# Patient Record
Sex: MALE | Race: BLACK OR AFRICAN AMERICAN | NOT HISPANIC OR LATINO | URBAN - METROPOLITAN AREA
[De-identification: names, ages, dates, MRNs, and addresses within clinical notes are randomized per-mention and may not be internally consistent; named-entity substitution may affect disease eponyms.]

---

## 2022-06-22 VITALS
RESPIRATION RATE: 16 BRPM | TEMPERATURE: 98 F | SYSTOLIC BLOOD PRESSURE: 144 MMHG | DIASTOLIC BLOOD PRESSURE: 65 MMHG | WEIGHT: 240.08 LBS | HEIGHT: 73 IN | OXYGEN SATURATION: 99 % | HEART RATE: 68 BPM

## 2022-06-22 LAB
ALBUMIN SERPL ELPH-MCNC: 4.1 G/DL — SIGNIFICANT CHANGE UP (ref 3.3–5)
ALP SERPL-CCNC: 73 U/L — SIGNIFICANT CHANGE UP (ref 40–120)
ALT FLD-CCNC: 10 U/L — SIGNIFICANT CHANGE UP (ref 10–45)
ANION GAP SERPL CALC-SCNC: 11 MMOL/L — SIGNIFICANT CHANGE UP (ref 5–17)
APTT BLD: 33.1 SEC — SIGNIFICANT CHANGE UP (ref 27.5–35.5)
AST SERPL-CCNC: 14 U/L — SIGNIFICANT CHANGE UP (ref 10–40)
BASOPHILS # BLD AUTO: 0.03 K/UL — SIGNIFICANT CHANGE UP (ref 0–0.2)
BASOPHILS NFR BLD AUTO: 0.5 % — SIGNIFICANT CHANGE UP (ref 0–2)
BILIRUB SERPL-MCNC: 1.1 MG/DL — SIGNIFICANT CHANGE UP (ref 0.2–1.2)
BUN SERPL-MCNC: 6 MG/DL — LOW (ref 7–23)
CALCIUM SERPL-MCNC: 8.7 MG/DL — SIGNIFICANT CHANGE UP (ref 8.4–10.5)
CHLORIDE SERPL-SCNC: 109 MMOL/L — HIGH (ref 96–108)
CO2 SERPL-SCNC: 22 MMOL/L — SIGNIFICANT CHANGE UP (ref 22–31)
CREAT SERPL-MCNC: 0.91 MG/DL — SIGNIFICANT CHANGE UP (ref 0.5–1.3)
EGFR: 122 ML/MIN/1.73M2 — SIGNIFICANT CHANGE UP
EOSINOPHIL # BLD AUTO: 0.17 K/UL — SIGNIFICANT CHANGE UP (ref 0–0.5)
EOSINOPHIL NFR BLD AUTO: 2.6 % — SIGNIFICANT CHANGE UP (ref 0–6)
GLUCOSE SERPL-MCNC: 106 MG/DL — HIGH (ref 70–99)
HCT VFR BLD CALC: 32.7 % — LOW (ref 39–50)
HGB BLD-MCNC: 11.2 G/DL — LOW (ref 13–17)
IMM GRANULOCYTES NFR BLD AUTO: 0.2 % — SIGNIFICANT CHANGE UP (ref 0–1.5)
INR BLD: 1.27 — HIGH (ref 0.88–1.16)
LIDOCAIN IGE QN: 23 U/L — SIGNIFICANT CHANGE UP (ref 7–60)
LYMPHOCYTES # BLD AUTO: 1.88 K/UL — SIGNIFICANT CHANGE UP (ref 1–3.3)
LYMPHOCYTES # BLD AUTO: 28.7 % — SIGNIFICANT CHANGE UP (ref 13–44)
MAGNESIUM SERPL-MCNC: 1.8 MG/DL — SIGNIFICANT CHANGE UP (ref 1.6–2.6)
MCHC RBC-ENTMCNC: 25.9 PG — LOW (ref 27–34)
MCHC RBC-ENTMCNC: 34.3 GM/DL — SIGNIFICANT CHANGE UP (ref 32–36)
MCV RBC AUTO: 75.5 FL — LOW (ref 80–100)
MONOCYTES # BLD AUTO: 0.39 K/UL — SIGNIFICANT CHANGE UP (ref 0–0.9)
MONOCYTES NFR BLD AUTO: 6 % — SIGNIFICANT CHANGE UP (ref 2–14)
NEUTROPHILS # BLD AUTO: 4.06 K/UL — SIGNIFICANT CHANGE UP (ref 1.8–7.4)
NEUTROPHILS NFR BLD AUTO: 62 % — SIGNIFICANT CHANGE UP (ref 43–77)
NRBC # BLD: 0 /100 WBCS — SIGNIFICANT CHANGE UP (ref 0–0)
PLATELET # BLD AUTO: 213 K/UL — SIGNIFICANT CHANGE UP (ref 150–400)
POTASSIUM SERPL-MCNC: 3.5 MMOL/L — SIGNIFICANT CHANGE UP (ref 3.5–5.3)
POTASSIUM SERPL-SCNC: 3.5 MMOL/L — SIGNIFICANT CHANGE UP (ref 3.5–5.3)
PROT SERPL-MCNC: 7.1 G/DL — SIGNIFICANT CHANGE UP (ref 6–8.3)
PROTHROM AB SERPL-ACNC: 15.2 SEC — HIGH (ref 10.5–13.4)
RBC # BLD: 4.33 M/UL — SIGNIFICANT CHANGE UP (ref 4.2–5.8)
RBC # FLD: 16.1 % — HIGH (ref 10.3–14.5)
SARS-COV-2 RNA SPEC QL NAA+PROBE: NEGATIVE — SIGNIFICANT CHANGE UP
SODIUM SERPL-SCNC: 142 MMOL/L — SIGNIFICANT CHANGE UP (ref 135–145)
WBC # BLD: 6.54 K/UL — SIGNIFICANT CHANGE UP (ref 3.8–10.5)
WBC # FLD AUTO: 6.54 K/UL — SIGNIFICANT CHANGE UP (ref 3.8–10.5)

## 2022-06-22 PROCEDURE — G1004: CPT

## 2022-06-22 PROCEDURE — 74177 CT ABD & PELVIS W/CONTRAST: CPT | Mod: 26,MG

## 2022-06-22 PROCEDURE — 99285 EMERGENCY DEPT VISIT HI MDM: CPT

## 2022-06-22 RX ORDER — DIATRIZOATE MEGLUMINE 180 MG/ML
30 INJECTION, SOLUTION INTRAVESICAL ONCE
Refills: 0 | Status: COMPLETED | OUTPATIENT
Start: 2022-06-22 | End: 2022-06-22

## 2022-06-22 RX ORDER — SODIUM CHLORIDE 9 MG/ML
1000 INJECTION INTRAMUSCULAR; INTRAVENOUS; SUBCUTANEOUS ONCE
Refills: 0 | Status: COMPLETED | OUTPATIENT
Start: 2022-06-22 | End: 2022-06-22

## 2022-06-22 RX ORDER — HYDROMORPHONE HYDROCHLORIDE 2 MG/ML
1 INJECTION INTRAMUSCULAR; INTRAVENOUS; SUBCUTANEOUS ONCE
Refills: 0 | Status: DISCONTINUED | OUTPATIENT
Start: 2022-06-22 | End: 2022-06-22

## 2022-06-22 RX ADMIN — DIATRIZOATE MEGLUMINE 30 MILLILITER(S): 180 INJECTION, SOLUTION INTRAVESICAL at 21:20

## 2022-06-22 RX ADMIN — HYDROMORPHONE HYDROCHLORIDE 1 MILLIGRAM(S): 2 INJECTION INTRAMUSCULAR; INTRAVENOUS; SUBCUTANEOUS at 23:23

## 2022-06-22 RX ADMIN — SODIUM CHLORIDE 1000 MILLILITER(S): 9 INJECTION INTRAMUSCULAR; INTRAVENOUS; SUBCUTANEOUS at 22:27

## 2022-06-22 RX ADMIN — HYDROMORPHONE HYDROCHLORIDE 1 MILLIGRAM(S): 2 INJECTION INTRAMUSCULAR; INTRAVENOUS; SUBCUTANEOUS at 22:27

## 2022-06-22 RX ADMIN — SODIUM CHLORIDE 1000 MILLILITER(S): 9 INJECTION INTRAMUSCULAR; INTRAVENOUS; SUBCUTANEOUS at 21:20

## 2022-06-22 NOTE — ED PROVIDER NOTE - PROGRESS NOTE DETAILS
surgery consulted. will see pt. surgery reccs for admission to surgery/regional under dr salgado. surgery agrees no medication at this time and admission to surgery/regional under dr salgado.

## 2022-06-22 NOTE — ED PROVIDER NOTE - OBJECTIVE STATEMENT
21M nonsmoker, sickle cell anemia (last crisis 3/2022), c/o 3d diffuse crampy abd pain and loose brown stool x2-3 episodes per day and now 2d postprandial nbnb emesis. no change in appetite -- feels hungry. seen at [facility] 2d ago and offered admission but declined at that time given "family emergency." subsequently notified yesterday +e coli on stool cx. no ct imaging done at that time. pt also reports ble pain consistent w/ his usual sickle cell crisis. no fever/chills, no ha/dizziness, no uri/cough, no cp/sob, no hematochezia/melena, no dysuria, no rash, no recent travel, no sick contacts, no prior covid, no trauma, no etoh-dpt/ivdu, no abd surgeries, no phx/fhx GI disease. 21M nonsmoker, sickle cell anemia (last crisis 3/2022 -- usually joint/extremity pain, never abd), c/o 3d diffuse crampy abd pain and loose brown stool x2-3 episodes per day and now 2d postprandial nbnb emesis. no change in appetite -- feels hungry. seen at [facility] 2d ago and offered admission but declined at that time given "family emergency." subsequently notified yesterday +e coli on stool cx. no ct imaging done at that time. pt also reports ble pain consistent w/ his usual sickle cell crisis. no fever/chills, no ha/dizziness, no uri/cough, no cp/sob, no hematochezia/melena, no dysuria, no rash, no recent travel, no sick contacts, no prior covid, no trauma, no etoh-dpt/ivdu, no abd surgeries, no phx/fhx GI disease.

## 2022-06-22 NOTE — ED ADULT NURSE NOTE - OBJECTIVE STATEMENT
pt c/o abdominal pain for the past 5 days. pt went to [facility] 2 days ago, got worked up, and was told to come to the ED for antibiotics because stool was + for e.coli. abdomen is soft nondistended. endorses nausea, vomiting and diarrhea. vomit is orange in color, and has brown watery liquid stool. pt has hx of SCA and asthma.

## 2022-06-22 NOTE — ED PROVIDER NOTE - PHYSICAL EXAMINATION
CONST: nontoxic NAD speaking in full sentences  HEAD: atraumatic  EYES: conjunctivae clear, PERRL, EOMI  ENT: mmm  NECK: supple/FROM  CARD: rrr no murmurs  CHEST: ctab no r/r/w,  ABD: soft, nd, nttp, no rebound/guarding  EXT: FROM, symmetric distal pulses intact  SKIN: warm, dry, no rash, no pedal edema/ttp/rash, cap refill <2sec  NEURO: a+ox3, 5/5 strength x4, gross sensation intact x4, baseline gait CONST: nontoxic NAD speaking in full sentences  HEAD: atraumatic  EYES: conjunctivae clear, PERRL, EOMI  ENT: mmm  NECK: supple/FROM  CARD: rrr no murmurs  CHEST: ctab no r/r/w,  ABD: soft, nd, +mild nonfocal diffuse ttp, no rebound/guarding  EXT: FROM, symmetric distal pulses intact  SKIN: warm, dry, no rash, no pedal edema/ttp/rash, cap refill <2sec  NEURO: a+ox3, 5/5 strength x4, gross sensation intact x4, baseline gait

## 2022-06-22 NOTE — ED PROVIDER NOTE - CLINICAL SUMMARY MEDICAL DECISION MAKING FREE TEXT BOX
avss. nontoxic. NAD. found to have colitis vs early appendicitis on ct a/p in setting of sickle cell. no e/o sepsis. lactate wnl. LFTs/lipase neg. stool cx pending. pain controlled s/p diluadid/ivf. still w/ persistent diarrhea while in ED. surgery consulted. reportedly recent stool cx e coli+. no indication for abx at this time pending ecoli differentiation on stool cx. will admit per reccs.

## 2022-06-22 NOTE — ED ADULT NURSE NOTE - NSIMPLEMENTINTERV_GEN_ALL_ED
Implemented All Universal Safety Interventions:  [place]innie to call system. Call bell, personal items and telephone within reach. Instruct patient to call for assistance. Room bathroom lighting operational. Non-slip footwear when patient is off stretcher. Physically safe environment: no spills, clutter or unnecessary equipment. Stretcher in lowest position, wheels locked, appropriate side rails in place.

## 2022-06-22 NOTE — ED PROVIDER NOTE - SHIFT CHANGE DETAILS
21M sickle cell c/o abd pain/n/v/d and recurrent sickle cell pain in ble. avss. reportedly told he had ecoli on stool cx. no indication for abx at this time. found to have colitis vs appendicitis on ct a/p. surgery consulted.    dispo: pending final ct a/p read and surgery reccs -- anticipate admission

## 2022-06-22 NOTE — ED PROVIDER NOTE - NS ED MD DISPO ADMIT [facility] PALLIATIVE CARE
Detail Level: Detailed Consent: The risks of atrophy were reviewed with the patient. Concentration Of Solution Injected (Mg/Ml): 10.0 X Size Of Lesion In Cm (Optional): 0 Include Z78.9 (Other Specified Conditions Influencing Health Status) As An Associated Diagnosis?: No Total Volume Injected (Ccs- Only Use Numbers And Decimals): .1 Medical Necessity Clause: This procedure was medically necessary because the lesions that were treated were: Kenalog Preparation: Kenalog Validate Note Data When Using Inventory: Yes NONE

## 2022-06-23 ENCOUNTER — INPATIENT (INPATIENT)
Facility: HOSPITAL | Age: 22
LOS: 2 days | Discharge: HOME | DRG: 371 | End: 2022-06-26
Attending: STUDENT IN AN ORGANIZED HEALTH CARE EDUCATION/TRAINING PROGRAM | Admitting: SURGERY
Payer: COMMERCIAL

## 2022-06-23 LAB
ANION GAP SERPL CALC-SCNC: 11 MMOL/L — SIGNIFICANT CHANGE UP (ref 5–17)
BLD GP AB SCN SERPL QL: NEGATIVE — SIGNIFICANT CHANGE UP
BUN SERPL-MCNC: 6 MG/DL — LOW (ref 7–23)
CALCIUM SERPL-MCNC: 8.7 MG/DL — SIGNIFICANT CHANGE UP (ref 8.4–10.5)
CHLORIDE SERPL-SCNC: 108 MMOL/L — SIGNIFICANT CHANGE UP (ref 96–108)
CO2 SERPL-SCNC: 24 MMOL/L — SIGNIFICANT CHANGE UP (ref 22–31)
CREAT SERPL-MCNC: 0.93 MG/DL — SIGNIFICANT CHANGE UP (ref 0.5–1.3)
CULTURE RESULTS: SIGNIFICANT CHANGE UP
EGFR: 119 ML/MIN/1.73M2 — SIGNIFICANT CHANGE UP
GLUCOSE SERPL-MCNC: 81 MG/DL — SIGNIFICANT CHANGE UP (ref 70–99)
HCT VFR BLD CALC: 29.8 % — LOW (ref 39–50)
HGB BLD-MCNC: 10.4 G/DL — LOW (ref 13–17)
LACTATE SERPL-SCNC: 0.8 MMOL/L — SIGNIFICANT CHANGE UP (ref 0.5–2)
MAGNESIUM SERPL-MCNC: 1.9 MG/DL — SIGNIFICANT CHANGE UP (ref 1.6–2.6)
MCHC RBC-ENTMCNC: 26.2 PG — LOW (ref 27–34)
MCHC RBC-ENTMCNC: 34.9 GM/DL — SIGNIFICANT CHANGE UP (ref 32–36)
MCV RBC AUTO: 75.1 FL — LOW (ref 80–100)
NRBC # BLD: 0 /100 WBCS — SIGNIFICANT CHANGE UP (ref 0–0)
PHOSPHATE SERPL-MCNC: 4.7 MG/DL — HIGH (ref 2.5–4.5)
PLATELET # BLD AUTO: 177 K/UL — SIGNIFICANT CHANGE UP (ref 150–400)
POTASSIUM SERPL-MCNC: 3.5 MMOL/L — SIGNIFICANT CHANGE UP (ref 3.5–5.3)
POTASSIUM SERPL-SCNC: 3.5 MMOL/L — SIGNIFICANT CHANGE UP (ref 3.5–5.3)
RBC # BLD: 3.97 M/UL — LOW (ref 4.2–5.8)
RBC # FLD: 15.8 % — HIGH (ref 10.3–14.5)
RH IG SCN BLD-IMP: POSITIVE — SIGNIFICANT CHANGE UP
SODIUM SERPL-SCNC: 143 MMOL/L — SIGNIFICANT CHANGE UP (ref 135–145)
SPECIMEN SOURCE: SIGNIFICANT CHANGE UP
WBC # BLD: 5.56 K/UL — SIGNIFICANT CHANGE UP (ref 3.8–10.5)
WBC # FLD AUTO: 5.56 K/UL — SIGNIFICANT CHANGE UP (ref 3.8–10.5)

## 2022-06-23 PROCEDURE — 71046 X-RAY EXAM CHEST 2 VIEWS: CPT | Mod: 26

## 2022-06-23 RX ORDER — SODIUM CHLORIDE 9 MG/ML
1000 INJECTION, SOLUTION INTRAVENOUS
Refills: 0 | Status: DISCONTINUED | OUTPATIENT
Start: 2022-06-23 | End: 2022-06-25

## 2022-06-23 RX ORDER — SODIUM CHLORIDE 9 MG/ML
1000 INJECTION, SOLUTION INTRAVENOUS
Refills: 0 | Status: DISCONTINUED | OUTPATIENT
Start: 2022-06-23 | End: 2022-06-23

## 2022-06-23 RX ORDER — FOLIC ACID 0.8 MG
1 TABLET ORAL DAILY
Refills: 0 | Status: DISCONTINUED | OUTPATIENT
Start: 2022-06-23 | End: 2022-06-26

## 2022-06-23 RX ORDER — ONDANSETRON 8 MG/1
4 TABLET, FILM COATED ORAL ONCE
Refills: 0 | Status: COMPLETED | OUTPATIENT
Start: 2022-06-23 | End: 2022-06-23

## 2022-06-23 RX ORDER — HYDROMORPHONE HYDROCHLORIDE 2 MG/ML
1 INJECTION INTRAMUSCULAR; INTRAVENOUS; SUBCUTANEOUS EVERY 4 HOURS
Refills: 0 | Status: DISCONTINUED | OUTPATIENT
Start: 2022-06-23 | End: 2022-06-23

## 2022-06-23 RX ORDER — HYDROMORPHONE HYDROCHLORIDE 2 MG/ML
0.5 INJECTION INTRAMUSCULAR; INTRAVENOUS; SUBCUTANEOUS EVERY 6 HOURS
Refills: 0 | Status: DISCONTINUED | OUTPATIENT
Start: 2022-06-23 | End: 2022-06-24

## 2022-06-23 RX ORDER — EPINEPHRINE 0.3 MG/.3ML
3 INJECTION INTRAMUSCULAR; SUBCUTANEOUS
Qty: 0 | Refills: 0 | DISCHARGE

## 2022-06-23 RX ORDER — HYDROMORPHONE HYDROCHLORIDE 2 MG/ML
0.5 INJECTION INTRAMUSCULAR; INTRAVENOUS; SUBCUTANEOUS EVERY 6 HOURS
Refills: 0 | Status: DISCONTINUED | OUTPATIENT
Start: 2022-06-23 | End: 2022-06-23

## 2022-06-23 RX ORDER — HYDROMORPHONE HYDROCHLORIDE 2 MG/ML
1 INJECTION INTRAMUSCULAR; INTRAVENOUS; SUBCUTANEOUS ONCE
Refills: 0 | Status: DISCONTINUED | OUTPATIENT
Start: 2022-06-23 | End: 2022-06-23

## 2022-06-23 RX ORDER — HYDROXYUREA 500 MG/1
1000 CAPSULE ORAL DAILY
Refills: 0 | Status: DISCONTINUED | OUTPATIENT
Start: 2022-06-23 | End: 2022-06-26

## 2022-06-23 RX ORDER — MAGNESIUM SULFATE 500 MG/ML
1 VIAL (ML) INJECTION ONCE
Refills: 0 | Status: COMPLETED | OUTPATIENT
Start: 2022-06-23 | End: 2022-06-23

## 2022-06-23 RX ORDER — HYDROXYUREA 500 MG/1
1000 CAPSULE ORAL DAILY
Refills: 0 | Status: DISCONTINUED | OUTPATIENT
Start: 2022-06-23 | End: 2022-06-23

## 2022-06-23 RX ORDER — ACETAMINOPHEN 500 MG
1000 TABLET ORAL EVERY 6 HOURS
Refills: 0 | Status: DISCONTINUED | OUTPATIENT
Start: 2022-06-23 | End: 2022-06-24

## 2022-06-23 RX ORDER — HYDROMORPHONE HYDROCHLORIDE 2 MG/ML
0.5 INJECTION INTRAMUSCULAR; INTRAVENOUS; SUBCUTANEOUS ONCE
Refills: 0 | Status: DISCONTINUED | OUTPATIENT
Start: 2022-06-23 | End: 2022-06-23

## 2022-06-23 RX ORDER — HEPARIN SODIUM 5000 [USP'U]/ML
5000 INJECTION INTRAVENOUS; SUBCUTANEOUS EVERY 8 HOURS
Refills: 0 | Status: DISCONTINUED | OUTPATIENT
Start: 2022-06-23 | End: 2022-06-26

## 2022-06-23 RX ORDER — ALBUTEROL 90 UG/1
2 AEROSOL, METERED ORAL EVERY 6 HOURS
Refills: 0 | Status: DISCONTINUED | OUTPATIENT
Start: 2022-06-23 | End: 2022-06-26

## 2022-06-23 RX ORDER — HYDROXYUREA 500 MG/1
1 CAPSULE ORAL
Qty: 0 | Refills: 0 | DISCHARGE

## 2022-06-23 RX ORDER — ACETAMINOPHEN 500 MG
1000 TABLET ORAL ONCE
Refills: 0 | Status: COMPLETED | OUTPATIENT
Start: 2022-06-23 | End: 2022-06-23

## 2022-06-23 RX ORDER — HYDROMORPHONE HYDROCHLORIDE 2 MG/ML
1 INJECTION INTRAMUSCULAR; INTRAVENOUS; SUBCUTANEOUS
Qty: 0 | Refills: 0 | DISCHARGE

## 2022-06-23 RX ORDER — POTASSIUM CHLORIDE 20 MEQ
40 PACKET (EA) ORAL ONCE
Refills: 0 | Status: COMPLETED | OUTPATIENT
Start: 2022-06-23 | End: 2022-06-23

## 2022-06-23 RX ORDER — FOLIC ACID 0.8 MG
1 TABLET ORAL
Qty: 0 | Refills: 0 | DISCHARGE

## 2022-06-23 RX ORDER — KETOROLAC TROMETHAMINE 30 MG/ML
15 SYRINGE (ML) INJECTION EVERY 6 HOURS
Refills: 0 | Status: DISCONTINUED | OUTPATIENT
Start: 2022-06-23 | End: 2022-06-24

## 2022-06-23 RX ORDER — HYDROMORPHONE HYDROCHLORIDE 2 MG/ML
0.5 INJECTION INTRAMUSCULAR; INTRAVENOUS; SUBCUTANEOUS EVERY 4 HOURS
Refills: 0 | Status: DISCONTINUED | OUTPATIENT
Start: 2022-06-23 | End: 2022-06-23

## 2022-06-23 RX ORDER — AZITHROMYCIN 500 MG/1
1000 TABLET, FILM COATED ORAL ONCE
Refills: 0 | Status: COMPLETED | OUTPATIENT
Start: 2022-06-23 | End: 2022-06-23

## 2022-06-23 RX ADMIN — HYDROMORPHONE HYDROCHLORIDE 0.5 MILLIGRAM(S): 2 INJECTION INTRAMUSCULAR; INTRAVENOUS; SUBCUTANEOUS at 13:40

## 2022-06-23 RX ADMIN — Medication 100 GRAM(S): at 08:30

## 2022-06-23 RX ADMIN — ONDANSETRON 4 MILLIGRAM(S): 8 TABLET, FILM COATED ORAL at 01:37

## 2022-06-23 RX ADMIN — HEPARIN SODIUM 5000 UNIT(S): 5000 INJECTION INTRAVENOUS; SUBCUTANEOUS at 06:02

## 2022-06-23 RX ADMIN — HEPARIN SODIUM 5000 UNIT(S): 5000 INJECTION INTRAVENOUS; SUBCUTANEOUS at 13:26

## 2022-06-23 RX ADMIN — HYDROMORPHONE HYDROCHLORIDE 1 MILLIGRAM(S): 2 INJECTION INTRAMUSCULAR; INTRAVENOUS; SUBCUTANEOUS at 09:00

## 2022-06-23 RX ADMIN — HYDROXYUREA 1000 MILLIGRAM(S): 500 CAPSULE ORAL at 11:23

## 2022-06-23 RX ADMIN — SODIUM CHLORIDE 170 MILLILITER(S): 9 INJECTION, SOLUTION INTRAVENOUS at 16:34

## 2022-06-23 RX ADMIN — HYDROMORPHONE HYDROCHLORIDE 0.5 MILLIGRAM(S): 2 INJECTION INTRAMUSCULAR; INTRAVENOUS; SUBCUTANEOUS at 13:25

## 2022-06-23 RX ADMIN — HYDROMORPHONE HYDROCHLORIDE 0.5 MILLIGRAM(S): 2 INJECTION INTRAMUSCULAR; INTRAVENOUS; SUBCUTANEOUS at 05:05

## 2022-06-23 RX ADMIN — Medication 400 MILLIGRAM(S): at 11:22

## 2022-06-23 RX ADMIN — Medication 15 MILLIGRAM(S): at 16:49

## 2022-06-23 RX ADMIN — HYDROMORPHONE HYDROCHLORIDE 0.5 MILLIGRAM(S): 2 INJECTION INTRAMUSCULAR; INTRAVENOUS; SUBCUTANEOUS at 22:05

## 2022-06-23 RX ADMIN — HYDROMORPHONE HYDROCHLORIDE 0.5 MILLIGRAM(S): 2 INJECTION INTRAMUSCULAR; INTRAVENOUS; SUBCUTANEOUS at 08:30

## 2022-06-23 RX ADMIN — HEPARIN SODIUM 5000 UNIT(S): 5000 INJECTION INTRAVENOUS; SUBCUTANEOUS at 21:13

## 2022-06-23 RX ADMIN — HYDROMORPHONE HYDROCHLORIDE 1 MILLIGRAM(S): 2 INJECTION INTRAMUSCULAR; INTRAVENOUS; SUBCUTANEOUS at 08:30

## 2022-06-23 RX ADMIN — SODIUM CHLORIDE 150 MILLILITER(S): 9 INJECTION, SOLUTION INTRAVENOUS at 04:52

## 2022-06-23 RX ADMIN — Medication 40 MILLIEQUIVALENT(S): at 08:29

## 2022-06-23 RX ADMIN — HYDROMORPHONE HYDROCHLORIDE 0.5 MILLIGRAM(S): 2 INJECTION INTRAMUSCULAR; INTRAVENOUS; SUBCUTANEOUS at 18:44

## 2022-06-23 RX ADMIN — HYDROMORPHONE HYDROCHLORIDE 1 MILLIGRAM(S): 2 INJECTION INTRAMUSCULAR; INTRAVENOUS; SUBCUTANEOUS at 03:48

## 2022-06-23 RX ADMIN — HYDROMORPHONE HYDROCHLORIDE 0.5 MILLIGRAM(S): 2 INJECTION INTRAMUSCULAR; INTRAVENOUS; SUBCUTANEOUS at 18:29

## 2022-06-23 RX ADMIN — Medication 15 MILLIGRAM(S): at 16:34

## 2022-06-23 RX ADMIN — Medication 1 MILLIGRAM(S): at 11:23

## 2022-06-23 RX ADMIN — HYDROMORPHONE HYDROCHLORIDE 1 MILLIGRAM(S): 2 INJECTION INTRAMUSCULAR; INTRAVENOUS; SUBCUTANEOUS at 01:36

## 2022-06-23 RX ADMIN — HYDROMORPHONE HYDROCHLORIDE 0.5 MILLIGRAM(S): 2 INJECTION INTRAMUSCULAR; INTRAVENOUS; SUBCUTANEOUS at 21:13

## 2022-06-23 RX ADMIN — AZITHROMYCIN 1000 MILLIGRAM(S): 500 TABLET, FILM COATED ORAL at 19:37

## 2022-06-23 RX ADMIN — SODIUM CHLORIDE 170 MILLILITER(S): 9 INJECTION, SOLUTION INTRAVENOUS at 23:21

## 2022-06-23 RX ADMIN — Medication 15 MILLIGRAM(S): at 23:21

## 2022-06-23 NOTE — H&P ADULT - NSHPPHYSICALEXAM_GEN_ALL_CORE
VS: afebrile, HR68, /65, RR16, O2 99%    Exam  Gen: well appearing, NAD  Pulm: nonlabored, no respiratory distress  Abdo: soft, ND, tender to palpation of bilateral lower quadrants, L>R  Extrem: WWP, palpable DP bilaterally, nonedematous

## 2022-06-23 NOTE — CONSULT NOTE ADULT - SUBJECTIVE AND OBJECTIVE BOX
21 year old man with     # Sickle cell anemia with vaso-occlusive pain crisis   [ ] check CMP, LDH, haptoglobin, reticulocyte count   [ ] CXR PA lateral   Currently without any dyspnea, no fever. No signs of acute chest.   Monitor for acute chest- If patient has CXR findings AND fever or respiratory symptoms, would start albuterol neb q6, transfuse to goal Hgb 9 and consult heme     Re: Pain management   Recommend Patient-controlled Analgesia. Awaiting callback from patient's hematologist regarding his usual PCA requirement when admitted with pain crisis.   [ ] In the meantime, based on his current outpatient opiate requirement (hydromorphone PO 4mg q4hrs), recommend PCA at 0.20mg/hr continuous rate, with 0.1mg PCA demand dose, delay interval 15 minutes. Start PCA with 1.0mg bolus (loading dose).     # Diarrhea   Increase in frequency and volume of diarrhea over the last 5 days. Now having >5 watery bowel movements a day with abdominal pain   GI PCR showing E coli.   Co-infection with Cdiff still possible. Recommend [ ] checking C diff in addition to GI PCR,  given worsening diarrhea, >5 watery BM a day + abdominal pain,   Consider pausing hydroxyurea depending on severity of symptoms tomorrow.      21 year old man with     # Sickle cell anemia with vaso-occlusive pain crisis   [ ] check CMP, LDH, haptoglobin, reticulocyte count   [ ] CXR PA lateral   Currently without any dyspnea, no fever. No signs of acute chest.   Monitor for acute chest- If patient has CXR findings AND fever or respiratory symptoms, would start albuterol neb q6, transfuse to goal Hgb 9 and consult heme     Re: Pain management   Recommend Patient-controlled Analgesia. Awaiting callback from patient's hematologist regarding his usual PCA requirement when admitted with pain crisis.   [ ] In the meantime, based on his current outpatient opiate requirement (hydromorphone PO 4mg q4hrs), recommend PCA at 0.20mg/hr continuous rate, with 0.1mg PCA demand dose, delay interval 15 minutes. Start PCA with 1.0mg bolus (loading dose).     # Diarrhea   Increase in frequency and volume of diarrhea over the last 5 days. Now having >5 watery bowel movements a day with abdominal pain   GI PCR showing E coli.   Co-infection with Cdiff still possible. Recommend [ ] checking C diff in addition to GI PCR,  given worsening diarrhea, >5 watery BM a day + abdominal pain,   Consider pausing hydroxyurea depending on severity of symptoms tomorrow .

## 2022-06-23 NOTE — CHART NOTE - NSCHARTNOTEFT_GEN_A_CORE
Confidential Drug Utilization Report  Search Terms: samuel finley, 2000Search Date: 06/23/2022 13:23:18 PM  Searching on behalf of: Myself  The Drug Utilization Report below displays all of the controlled substance prescriptions, if any, that your patient has filled in the last twelve months. The information displayed on this report is compiled from pharmacy submissions to the Department, and accurately reflects the information as submitted by the pharmacies.    This report was requested by: Simran Bond | Reference #: [id number]    Others' Prescriptions  Patient Name: Samuel FinleyBirth Date: 2000  Address: [address] [place] [address] [address] [place], [place] [address]Iij: Male  Rx Written	Rx Dispensed	Drug	Quantity	Days Supply	Prescriber Name	Prescriber Kisha #	Payment Method	Dispenser  05/31/2022	06/01/2022	hydromorphone 4 mg tablet	112	18	Breanna Romo	[id number]	Insurance	[facility] [id number]  05/17/2022	05/18/2022	hydromorphone 4 mg tablet	56	9	Breanna Romo	[id number]	Insurance	[facility] [id number]  05/09/2022	05/09/2022	hydromorphone 4 mg tablet	42	6	Mary Gr	[id number]	Insurance	[facility] [id number]  05/03/2022	05/04/2022	hydromorphone 4 mg tablet	42	7	Avelino Hardy	[id number]	Insurance	[facility] [id number]  04/19/2022	04/20/2022	oxycodone-acetaminophen 5-325 mg tab	28	14	Kulwant Lemus	[id number]	Insurance	[facility] [id number]  04/17/2022	04/18/2022	hydromorphone 4 mg tablet	20	3	Jenelle Nagel	[id number]	Insurance	[facility] [id number]  04/06/2022	04/06/2022	hydromorphone 4 mg tablet	42	7	Kurt Davis	[id number]	Insurance	[facility] [id number]  03/01/2022	03/01/2022	oxycodone-acetaminophen 5-325 mg tab	56	14	Kulwant Lemus	[id number]	Insurance	[facility] [id number]  02/01/2022	02/02/2022	oxycodone-acetaminophen 5-325 mg tab	56	14	Kulwant Lemus	[id number]	Insurance	[facility] [id number]  12/20/2021	12/20/2021	oxycodone hcl er 10 mg tablet	28	14	Claire Echeverria	[id number]	Insurance	[facility] [id number]    Patient Name: Samuel FinleyBirth Date: 2000  Address: [address] [place] [address]Sym: Male  Rx Written	Rx Dispensed	Drug	Quantity	Days Supply	Prescriber Name	Prescriber Kisha #	Payment Method	Dispenser  02/24/2022	02/24/2022	oxycodone hcl (ir) 10 mg tab	14	3	[facility]	[id number]	[facility]  01/19/2022	01/19/2022	oxycodone hcl (ir) 10 mg tab	20	5	[facility]	[id number]	Medicaid	[facility]    Patient Name: Samuel FinleyBirth Date: 2000  Address[address] [place], [place] [address]Bct: Male  Rx Written	Rx Dispensed	Drug	Quantity	Days Supply	Prescriber Name	Prescriber Kisha #	Payment Method	Dispenser  03/22/2022	03/23/2022	oxycodone-acetaminophen 5-325 mg tab	14	2	Judi Tracey	[id number]	Insurance	[facility] [id number]  01/12/2022	01/12/2022	oxycodone hcl (ir) 10 mg tab	30	5	Mary Gr	[id number]	Insurance	[facility] [id number]  12/02/2021	12/11/2021	buprenorphine-naloxone 8-2 mg sl film	90	30	Mary Gr	[id number]	Insurance	[facility] [id number]  09/27/2021	09/28/2021	buprenorphine-naloxone 8-2 mg sl film	45	15	Mary Gr	[id number]	Insurance	[facility] [id number]  09/09/2021	09/10/2021	buprenorphine-naloxone 8-2 mg sl film	54	18	Breanna Romo	[id number]	Insurance	[facility] [id number]  09/09/2021	09/10/2021	oxycodone hcl 10 mg tablet	72	18	Breanna Romo	[id number]	Insurance	[facility] [id number]  09/02/2021	09/02/2021	buprenorphine-naloxone 8-2 mg sl film	21	7	Mary Gr	[id number]	Insurance	[facility] [id number]  08/30/2021	08/30/2021	buprenorphine-naloxone 4-1 mg sl film	18	6	Mary Gr	[id number]	Insurance	[facility] [id number]  08/17/2021	08/19/2021	buprenorphine-naloxone 2-0.5 mg sl film	13	13	Mary Gr	[id number]	Insurance	[facility] [id number]  08/12/2021	08/16/2021	oxycodone hcl 15 mg tablet	84	14	Juid Tracey	[id number]	Insurance	[facility] [id number]  07/30/2021	08/02/2021	oxycodone hcl 15 mg tablet	84	14	Breanna Romo	[id number]	Feliz	[facility] [id number]  07/13/2021	07/15/2021	oxycodone hcl (ir) 15 mg tab	90	15	Mary Gr	[id number]	Insurance	[facility] [id number]  06/28/2021	07/01/2021	oxycodone hcl 15 mg tablet	90	15	Mary Gr	[id number]	Insurance	[facility] [id number]    Patient Name: Samuel Kaur Date: 2000  Address: [address] [place] [address]Fiv: Male  Rx Written	Rx Dispensed	Drug	Quantity	Days Supply	Prescriber Name	Prescriber Kisha #	Payment Method	Dispenser  04/13/2021	03/07/2022	high thc chewable gels 9.5mg thc:0.5mg cbd/brett/gel chew	1	3	Hammad Gunn J, M D	[id number]	[facility]  04/13/2021	03/07/2022	sativa ground flower 4mg thc, <0.01mg cbd /dose	1	2	Hammad Gunn J, M D	[id number]	[facility]  04/13/2021	12/23/2021	ccny whole flower 1.1mg ? 2.0mg thc:<0.5mg cbd/inh	1	1	Hammad Gunn J, M D	[id number]	[facility]  04/13/2021	12/23/2021	ccny whole flower 3.1mg ? 4.0mg thc:<0.5mg cbd/inh	1	1	Hammad Gunn J, M D	[id number]	[facility]  04/13/2021	12/23/2021	high thc:low cbd chewable gel 9.5mg thc:0.5mg cbd/stbry/gel	1	3	Hammad Gunn J, M D	[id number]	[facility]  04/13/2021	12/02/2021	sativa ground flower 2mg thc, 3mg cbd /dose	7	2	Hammad Gunn J, M D	[id number]	[facility]  04/13/2021	11/20/2021	high thc chewable gels 9.5mg thc:0.5mg cbd/brett/gel chew	1	3	Hammad Gunn J, M D	[id number]	[facility]  04/13/2021	11/20/2021	ccny whole flower 5.1mg - 5.6mg thc:<0.5mg cbd/inh	1	1	Hammad Gunn J, M D	[id number]	[facility]  10/26/2021	10/29/2021	buprenorphine-naloxone 8-2 mg sl film	90	30	Mary Gr T	[id number]	[id number]  04/13/2021	08/21/2021	sativa ground flower 4mg thc, <0.01mg cbd /dose	1	2	Hammad Gunn J, M D	[id number]	[facility]  04/13/2021	07/08/2021	sativa ground flower 4mg thc, <0.01mg cbd /dose	1	5	Hammad Gunn J, M D	[id number]	[facility]    Patient Name: Samuel FinleyBirth Date: 2000  Address: [address] [place], [place] [address]Yzc: Male  Rx Written	Rx Dispensed	Drug	Quantity	Days Supply	Prescriber Name	Prescriber Kisha #	Payment Method	Dispenser  01/30/2022	01/30/2022	oxycodone hcl (ir) 5 mg tablet	20	5	Roosevelt Garcia	[id number]	Other	[facility]  01/30/2022	01/30/2022	oxycontin er 20 mg tablet	14	7	Roosevelt Garcia	[id number]	Other	[facility]  * - Drugs marked with an asterisk are compound drugs. If the compound drug is made up of more than one controlled substance, then each controlled substance will be a separate row in the table. Confidential Drug Utilization Report  Search Terms: samuel carrillo, 2000Search Date: 06/23/2022 13:23:18 PM  Searching on behalf of: Myself  The Drug Utilization Report below displays all of the controlled substance prescriptions, if any, that your patient has filled in the last twelve months. The information displayed on this report is compiled from pharmacy submissions to the Department, and accurately reflects the information as submitted by the pharmacies.    This report was requested by: Simran Bond | Reference #: [id number]    Others' Prescriptions  Patient Name: Samuel CarrilloBirth Date: 2000  Address: [address] [place] [address] [address] [place], [place] [address]Igc: Male  Rx Written	Rx Dispensed	Drug	Quantity	Days Supply	Prescriber Name	Prescriber Kisha #	Payment Method	Dispenser  05/31/2022	06/01/2022	hydromorphone 4 mg tablet	112	18	Breanna Romo	[id number]	Insurance	[facility] [id number]  05/17/2022	05/18/2022	hydromorphone 4 mg tablet	56	9	Breanna Romo	[id number]	Insurance	[facility] [id number]  05/09/2022	05/09/2022	hydromorphone 4 mg tablet	42	6	Mary Gr	[id number]	Insurance	[facility] [id number]  05/03/2022	05/04/2022	hydromorphone 4 mg tablet	42	7	Avelino Hardy	[id number]	Insurance	[facility] [id number]  04/19/2022	04/20/2022	oxycodone-acetaminophen 5-325 mg tab	28	14	Kulwant Lemus	[id number]	Insurance	[facility] [id number]  04/17/2022	04/18/2022	hydromorphone 4 mg tablet	20	3	Jenelle Nagel	[id number]	Insurance	[facility] [id number]  04/06/2022	04/06/2022	hydromorphone 4 mg tablet	42	7	Kurt Davis	[id number]	Insurance	[facility] [id number]  03/01/2022	03/01/2022	oxycodone-acetaminophen 5-325 mg tab	56	14	Kulwant Lemus	[id number]	Insurance	[facility] [id number]  02/01/2022	02/02/2022	oxycodone-acetaminophen 5-325 mg tab	56	14	Kulwant Lemus	[id number]	Insurance	[facility] [id number]  12/20/2021	12/20/2021	oxycodone hcl er 10 mg tablet	28	14	Claire Echeverria	[id number]	Insurance	[facility] [id number]    Patient Name: Samuel CarrilloBirth Date: 2000  Address: [address] [place] [id number]: Male  Rx Written	Rx Dispensed	Drug	Quantity	Days Supply	Prescriber Name	Prescriber Kisha #	Payment Method	Dispenser  02/24/2022	02/24/2022	oxycodone hcl (ir) 10 mg tab	14	3	[facility]	[id number]	[facility]  01/19/2022	01/19/2022	oxycodone hcl (ir) 10 mg tab	20	5	[facility]	[id number]	Medicaid	[facility]    Patient Name: Samuel CarrilloBirth Date: 2000  Address[address] [place], [place] [address]Wmb: Male  Rx Written	Rx Dispensed	Drug	Quantity	Days Supply	Prescriber Name	Prescriber Kisha #	Payment Method	Dispenser  03/22/2022	03/23/2022	oxycodone-acetaminophen 5-325 mg tab	14	2	Judi Tracey	[id number]	Insurance	[facility] [id number]  01/12/2022	01/12/2022	oxycodone hcl (ir) 10 mg tab	30	5	Mary Gr	[id number]	Insurance	[facility] [id number]  12/02/2021	12/11/2021	buprenorphine-naloxone 8-2 mg sl film	90	30	Mary Gr	[id number]	Insurance	[facility] [id number]  09/27/2021	09/28/2021	buprenorphine-naloxone 8-2 mg sl film	45	15	Mary Gr	[id number]	Insurance	[facility] [id number]  09/09/2021	09/10/2021	buprenorphine-naloxone 8-2 mg sl film	54	18	Breanna Romo	[id number]	Insurance	[facility] [id number]  09/09/2021	09/10/2021	oxycodone hcl 10 mg tablet	72	18	Breanna Romo	[id number]	Insurance	[facility] [id number]  09/02/2021	09/02/2021	buprenorphine-naloxone 8-2 mg sl film	21	7	Mary Gr	[id number]	Insurance	[facility] [id number]  08/30/2021	08/30/2021	buprenorphine-naloxone 4-1 mg sl film	18	6	Mary Gr	[id number]	Insurance	[facility] [id number]  08/17/2021	08/19/2021	buprenorphine-naloxone 2-0.5 mg sl film	13	13	Mary Gr	[id number]	Insurance	[facility] [id number]  08/12/2021	08/16/2021	oxycodone hcl 15 mg tablet	84	14	Judi Tracey	[id number]	Insurance	[facility] [id number]  07/30/2021	08/02/2021	oxycodone hcl 15 mg tablet	84	14	Breanna Romo	[id number]	Feliz	[facility] [id number]  07/13/2021	07/15/2021	oxycodone hcl (ir) 15 mg tab	90	15	Mary Gr	[id number]	Insurance	[facility] [id number]  06/28/2021	07/01/2021	oxycodone hcl 15 mg tablet	90	15	Mary Gr	[id number]	Insurance	[facility] [id number]    Patient Name: Samuel Kaur Date: 2000  Address: [address] [place] [address]Gqg: Male  Rx Written	Rx Dispensed	Drug	Quantity	Days Supply	Prescriber Name	Prescriber Kisha #	Payment Method	Dispenser  04/13/2021	03/07/2022	high thc chewable gels 9.5mg thc:0.5mg cbd/brett/gel chew	1	3	Hammad Gunn J, M D	[id number]	[facility]  04/13/2021	03/07/2022	sativa ground flower 4mg thc, <0.01mg cbd /dose	1	2	Hammad Gunn J, M D	[id number]	[facility]  04/13/2021	12/23/2021	ccny whole flower 1.1mg ? 2.0mg thc:<0.5mg cbd/inh	1	1	Hammad Gunn J, M D	[id number]	[facility]  04/13/2021	12/23/2021	ccny whole flower 3.1mg ? 4.0mg thc:<0.5mg cbd/inh	1	1	Hammad Gunn J, M D	[id number]	[facility]  04/13/2021	12/23/2021	high thc:low cbd chewable gel 9.5mg thc:0.5mg cbd/stbry/gel	1	3	Hammad Gunn J, M D	[id number]	[facility]  04/13/2021	12/02/2021	sativa ground flower 2mg thc, 3mg cbd /dose	7	2	Hammad Gunn J, M D	[id number]	[facility]  04/13/2021	11/20/2021	high thc chewable gels 9.5mg thc:0.5mg cbd/brett/gel chew	1	3	Hammad Gunn J, M D	[id number]	[facility]  04/13/2021	11/20/2021	ccny whole flower 5.1mg - 5.6mg thc:<0.5mg cbd/inh	1	1	Hammad Gunn J, M D	[id number]	[facility]  10/26/2021	10/29/2021	buprenorphine-naloxone 8-2 mg sl film	90	30	Mary Gr T	[id number]	[id number]  04/13/2021	08/21/2021	sativa ground flower 4mg thc, <0.01mg cbd /dose	1	2	Hammad Gunn J, M D	[id number]	[facility]  04/13/2021	07/08/2021	sativa ground flower 4mg thc, <0.01mg cbd /dose	1	5	Hammad Gunn J, M D	[id number]	[facility]    Patient Name: Samuel CarrilloBirth Date: 2000  Address: [address] [place], [place] [address]Yfm: Male  Rx Written	Rx Dispensed	Drug	Quantity	Days Supply	Prescriber Name	Prescriber Kisha #	Payment Method	Dispenser  01/30/2022	01/30/2022	oxycodone hcl (ir) 5 mg tablet	20	5	Roosevelt Garcia	[id number]	Other	[facility]  01/30/2022	01/30/2022	oxycontin er 20 mg tablet	14	7	Roosevelt Garcia	[id number]	Other	[facility]  * - Drugs marked with an asterisk are compound drugs. If the compound drug is made up of more than one controlled substance, then each controlled substance will be a separate row in the table.    Others' Prescriptions  Patient Name: SAMUEL CARRILLOBirth Date: 2000  Address: [address] [place], [place] [address]Bly: Male  Rx Written	Rx Dispensed	Drug	Strength	Quantity	Days Supply	Prescriber Name	Payment Method	Dispenser  01/02/2022	01/02/2022	OXYCODONE HCL (IR) 15 MG TAB		20.0	5	JEANETTE CLOUD	[facility]

## 2022-06-23 NOTE — DISCHARGE NOTE PROVIDER - CARE PROVIDERS DIRECT ADDRESSES
[email or web address] ,DirectAddress_Unknown

## 2022-06-23 NOTE — PATIENT PROFILE ADULT - FALL HARM RISK - HARM RISK INTERVENTIONS

## 2022-06-23 NOTE — DISCHARGE NOTE PROVIDER - HOSPITAL COURSE
21M PMH sickle cell, asthma, no PSHx presented with 3 days of worsening diarrhea, po intolerance, nausea/emesis, and crampy lower abdominal pain. He also went to [place] 2 days ago, but declined admission because he had family matters to deal with. Was called 1 day ago and told that his stool was positive for E. Coli. His most recent sickle cell crisis was in April, and he typically has crises when the seasons change. His typical crisis symptoms include joint and extremity pain, but he denies any abdominal pain w/ crises. In the ED, VSS and WBC 6.5. CT with diffuse colonic wall thickening and hypoattenuation c/w colitis vs underdistension, appendix base dilation w/ borderline wall thickening. Pt was admitted for observation and repeat stool studies showed ......    **INCOMPLETE 6/23****   21M PMH sickle cell, asthma, no PSHx presented with 3 days of worsening diarrhea, po intolerance, nausea/emesis, and crampy lower abdominal pain. He also went to [place] 2 days ago, but declined admission because he had family matters to deal with. Was called 1 day ago and told that his stool was positive for E. Coli. His most recent sickle cell crisis was in April, and he typically has crises when the seasons change. His typical crisis symptoms include joint and extremity pain, but he denies any abdominal pain w/ crises. In the ED, VSS and WBC 6.5. CT with diffuse colonic wall thickening and hypoattenuation c/w colitis vs underdistension, appendix base dilation w/ borderline wall thickening. Pt was admitted for observation and repeat stool studies showed EPEC and EAEC and pt was given azithromycin 1g x 1. Pt began to c/o leg pain 2/2 sickle cell crisis, per outpt Hematologist, Dr. Singh, pt take diluadid 4mg PO at home PRN for pain and if pain not controlled gets admitted for IV dilaudid 2mg x 3 q1 hr followed by dilaudid PCA pump with toradol. . Pt clinically does not have appendicitis and not a surgical candidate. On 6/23 pt was transferred to medicine as for sickle cell crisis and ecoli colitis    **INCOMPLETE 6/23****   #Discharge: do not delete  21M PMH sickle cell, asthma, no PSHx presented with 3 days of worsening diarrhea, po intolerance, nausea/emesis, and crampy lower abdominal pain found to be in sickle cell crisis and E coli diarrhea    Problem List/Main Diagnoses (system-based):   #Sickle cell anemia  Currently without any dyspnea, no fever. No signs of acute chest.   Monitor for acute chest- If patient has CXR findings AND fever or respiratory symptoms, would start albuterol neb q6, transfuse to goal Hgb 9 and consult hematology   6/23 CXR PA/Lat no acute cardiopulmonary disease  Continued folic acid and hydroxyurea  Pain control with PCA pump    #Acute diarrhea  Increase in frequency and volume of diarrhea over the last 5 days. Now having >5 watery bowel movements a day with abdominal pain   GI PCR showing E coli --- EAEC. EPEC  s/p azithromycin PO 1g x 1--may shorten duration of symptoms  C. diff negative  Resolved      #Asthma  Continued albuterol prn.    New medications: None    Labs to be followed outpatient: None    Exam to be followed outpatient: None

## 2022-06-23 NOTE — H&P ADULT - NSHPLABSRESULTS_GEN_ALL_CORE
Labs: WBC 6.5, H/H 11.2/32.7, Cr 0.91, LFTs wnl, lactate 0.8, COVID negative    CT with diffuse colonic wall thickening and hypoattenuation c/w colitis vs underdistension, appendix base dilation w/ borderline wall thickening and small free fluid in RLQ c/w appendicitis vs colitis, diffuse ground glass parenchyma lungs infectious vs inflammatory, cardiomegaly, splenomegaly, cholelithiasis, mesenteric nodes most prominent in RLQ

## 2022-06-23 NOTE — H&P ADULT - ASSESSMENT
21M PMH sickle cell, asthma, no PSHx presents with 3 days of worsening diarrhea, po intolerance, nausea/emesis, and crampy lower abdominal pain. VSS and WBC 6.5. CT with diffuse colonic wall thickening and hypoattenuation c/w colitis vs underdistension, appendix base dilation w/ borderline wall thickening and small free fluid in RLQ     NPO/IVF  Pain/nausea control  SCD/SQH  IS/OOB  No abx  Stool studies  Regional, observation  AM labs  Team 4  Dr. Pelaez

## 2022-06-23 NOTE — DISCHARGE NOTE PROVIDER - NSDCCPCAREPLAN_GEN_ALL_CORE_FT
PRINCIPAL DISCHARGE DIAGNOSIS  Diagnosis: Sickle cell anemia  Assessment and Plan of Treatment: A sickle cell crisis is pain that can begin suddenly and last several days. It happens when sickled red blood cells block small blood vessels that carry blood to your bones. You might have pain in your back, knees, legs, arms, chest or stomach.   Continue your regimen of dilaudid as prescribed by your hematologist. Drink plenty of fluids, keeping yourself hydrated as dehydration can cause a sickle cell crisis.   Please follow up with your hematologist within 2 weeks from your discharge date.      
all other ROS negative except as per HPI

## 2022-06-23 NOTE — DISCHARGE NOTE PROVIDER - NSDCFUADDINST_GEN_ALL_CORE_FT
Please follow up with Dr. More in one week; you may call the office to make an appointment at your earliest convenience.

## 2022-06-23 NOTE — CHART NOTE - NSCHARTNOTEFT_GEN_A_CORE
21 year old man with     # Sickle cell anemia with vaso-occlusive pain crisis   [ ] check CMP, LDH, haptoglobin, reticulocyte count   [ ] CXR PA lateral   Currently without any dyspnea, no fever. No signs of acute chest.   Monitor for acute chest- If patient has CXR findings AND fever or respiratory symptoms, would start albuterol neb q6, transfuse to goal Hgb 9 and consult hematology     Re: Pain management   Recommend Patient-controlled Analgesia. Awaiting callback from patient's hematologists at [facility] and [facility] regarding his usual PCA requirement when admitted with pain crisis.   [ ] In the meantime, based on his current outpatient opiate requirement (hydromorphone PO 4mg q4hrs), recommend PCA at 0.20mg/hr continuous rate, with 0.1mg PCA demand dose, delay interval 15 minutes. Start PCA with 1.0mg bolus (loading dose) x 1.     # Diarrhea   Increase in frequency and volume of diarrhea over the last 5 days. Now having >5 watery bowel movements a day with abdominal pain   GI PCR showing E coli.   Co-infection with Cdiff still possible. Recommend [ ] checking C diff in addition to GI PCR,  given worsening diarrhea, >5 watery BM a day + abdominal pain,   Consider pausing hydroxyurea depending on severity of symptoms tomorrow .     Per discussion with surgery team 4, patient does not have appendicitis. No surgical needs this admission. Appears to have infectious diarrhea with possible vaso-occlusive pain crisis. Transfer accepted to internal medicine service. 21 year old man with     # Sickle cell anemia with vaso-occlusive pain crisis   [ ] check CMP, LDH, haptoglobin, reticulocyte count   [ ] CXR PA lateral   Currently without any dyspnea, no fever. No signs of acute chest.   Monitor for acute chest- If patient has CXR findings AND fever or respiratory symptoms, would start albuterol neb q6, transfuse to goal Hgb 9 and consult hematology     Re: Pain management   Recommend Patient-controlled Analgesia. Awaiting callback from patient's hematologists at [facility] and [facility] regarding his usual PCA requirement when admitted with pain crisis.   [ ] In the meantime, based on his current outpatient opiate requirement (hydromorphone PO 4mg q4hrs), recommend PCA at 0.20mg/hr continuous rate, with 0.1mg PCA demand dose, delay interval 15 minutes. Start PCA with 1.0mg bolus (loading dose) x 1.     # Diarrhea   Increase in frequency and volume of diarrhea over the last 5 days. Now having >5 watery bowel movements a day with abdominal pain   GI PCR showing E coli --- EAEC. EPEC  [ ] Treat with azithromycin PO 1g x 1--may shorten duration of symptoms  Co-infection with Cdiff still possible. Recommend [ ] checking C diff in addition to GI PCR,  given worsening diarrhea, >5 watery BM a day + abdominal pain,   Consider pausing hydroxyurea depending on severity of symptoms tomorrow .     Per discussion with surgery team 4, patient does not have appendicitis. No surgical needs this admission. Appears to have infectious diarrhea with possible vaso-occlusive pain crisis. Transfer accepted to internal medicine service.

## 2022-06-23 NOTE — DISCHARGE NOTE PROVIDER - CARE PROVIDER_API CALL
Eren More (MD)  Surgery  [address], [facility], [place] [address]  Phone: [phone]  Fax: [phone]  Follow Up Time:    AUDI ESPINOZA  Emergency Medicine  [address] [place] [address]  Phone: ()-  Fax: ()-  Follow Up Time: 2 weeks

## 2022-06-23 NOTE — CHART NOTE - NSCHARTNOTEFT_GEN_A_CORE
Surgery to medicine transfer    21M PM sickle cell, asthma, no PSHx presented with 3 days of worsening diarrhea, po intolerance, nausea/emesis, and crampy lower abdominal pain. He also went to [place] 2 days ago, but declined admission because he had family matters to deal with. Was called 1 day ago and told that his stool was positive for E. Coli. His most recent sickle cell crisis was in April, and he typically has crises when the seasons change. His typical crisis symptoms include joint and extremity pain, but he denies any abdominal pain w/ crises. In the ED, VSS and WBC 6.5. CT with diffuse colonic wall thickening and hypoattenuation c/w colitis, appendix base dilation w/ borderline wall thickening. Pt was admitted for observation and repeat stool studies showed EPEC and EAEC and pt was given azithromycin 1g x 1. Pt began to c/o leg pain 2/2 sickle cell crisis, per outpt Hematologist, Dr. Singh, pt take Dilaudid 4mg PO at home PRN for pain and if pain not controlled gets admitted for IV dilaudid 2mg x 3 q1 hr followed by dilaudid PCA pump with toradol. . Pt clinically does not have appendicitis and not a surgical candidate. On 6/23 pt was transferred to medicine as for sickle cell crisis and ecoli colitis

## 2022-06-23 NOTE — DISCHARGE NOTE PROVIDER - ATTENDING DISCHARGE PHYSICAL EXAMINATION:
Patient left AMA prior to attending exam    Briefly, 21M PMH sickle cell, asthma, no PSHx presented with 3 days of worsening diarrhea, po intolerance, nausea/emesis, and crampy lower abdominal pain found to be in sickle cell pain crisis and E coli diarrhea. Patient did not have evidence of hemolysis on labs, pain controlled on PCA pump.  Plan was to transition to [facility] on 6/26 and monitor but patient left AMA prior to transition.  Reported to resident that he will follow up with his hematologist   Patient left prior to attending exam    Briefly, 21M PMH sickle cell, asthma, no PSHx presented with 3 days of worsening diarrhea, po intolerance, nausea/emesis, and crampy lower abdominal pain found to be in sickle cell pain crisis and E coli diarrhea. Patient did not have evidence of hemolysis on labs, pain controlled on PCA pump.  Plan was to transition to [facility] on 6/26 and monitor but patient did not want to wait and left prior to transition or attending evaluation.  Reported to resident that he will follow up with his hematologist

## 2022-06-23 NOTE — H&P ADULT - HISTORY OF PRESENT ILLNESS
21M PMH sickle cell, asthma, no PSHx presents with 3 days of worsening diarrhea, po intolerance, nausea/emesis, and crampy lower abdominal pain. He also went to [place] 2 days ago, but declined admission because he had family matters to deal with. Was called 1 day ago and told that his stool was positive for E. Coli. Patient reports that his BMs began as loose stool, but now are completely liquid. He throws up if he eats solid food, but he is able to keep liquids down. He reports that he does feel some hunger. BMs were previously normal, regular, no blood. He feels a cramping lower abdominal pain bilaterally before BMs, and he intermittently has crampy lower abdominal pain at rest. Denies fever, constipation, SOB, dysuria. Endorses chills and mild chest pain at the start of symptoms which is now resolved. Never had cscope or EGD. His most recent sickle cell crisis was in April, and he typically has crises when the seasons change. His typical crisis symptoms include joint and extremity pain, but he denies any abdominal pain w/ crises.     PMH: sickle cell, asthma  PSHx: none  FamHx: aunt gastrointestinal problems requiring surgery, unsure if IBD   All: peanuts  Meds: hydroxyurea 1000 daily, monthly infusions, folic acid, hydromorphone 4mg prn, vitamin D 50,000U weekly, ibuprofen 600 prn, albuterol   SocHx: no tobacco use, no EtOH, works as a dance artist

## 2022-06-24 DIAGNOSIS — D57.1 SICKLE-CELL DISEASE WITHOUT CRISIS: ICD-10-CM

## 2022-06-24 DIAGNOSIS — Z29.9 ENCOUNTER FOR PROPHYLACTIC MEASURES, UNSPECIFIED: ICD-10-CM

## 2022-06-24 DIAGNOSIS — J45.909 UNSPECIFIED ASTHMA, UNCOMPLICATED: ICD-10-CM

## 2022-06-24 DIAGNOSIS — R19.7 DIARRHEA, UNSPECIFIED: ICD-10-CM

## 2022-06-24 LAB
ALBUMIN SERPL ELPH-MCNC: 3.7 G/DL — SIGNIFICANT CHANGE UP (ref 3.3–5)
ALP SERPL-CCNC: 67 U/L — SIGNIFICANT CHANGE UP (ref 40–120)
ALT FLD-CCNC: 9 U/L — LOW (ref 10–45)
ANION GAP SERPL CALC-SCNC: 9 MMOL/L — SIGNIFICANT CHANGE UP (ref 5–17)
ANISOCYTOSIS BLD QL: SLIGHT — SIGNIFICANT CHANGE UP
AST SERPL-CCNC: 12 U/L — SIGNIFICANT CHANGE UP (ref 10–40)
BASOPHILS # BLD AUTO: 0.05 K/UL — SIGNIFICANT CHANGE UP (ref 0–0.2)
BASOPHILS NFR BLD AUTO: 0.9 % — SIGNIFICANT CHANGE UP (ref 0–2)
BILIRUB SERPL-MCNC: 1 MG/DL — SIGNIFICANT CHANGE UP (ref 0.2–1.2)
BUN SERPL-MCNC: 7 MG/DL — SIGNIFICANT CHANGE UP (ref 7–23)
C DIFF GDH STL QL: NEGATIVE — SIGNIFICANT CHANGE UP
C DIFF GDH STL QL: SIGNIFICANT CHANGE UP
CALCIUM SERPL-MCNC: 8.5 MG/DL — SIGNIFICANT CHANGE UP (ref 8.4–10.5)
CHLORIDE SERPL-SCNC: 106 MMOL/L — SIGNIFICANT CHANGE UP (ref 96–108)
CK MB CFR SERPL CALC: <1 NG/ML — SIGNIFICANT CHANGE UP (ref 0–6.7)
CK SERPL-CCNC: 39 U/L — SIGNIFICANT CHANGE UP (ref 30–200)
CO2 SERPL-SCNC: 25 MMOL/L — SIGNIFICANT CHANGE UP (ref 22–31)
CREAT SERPL-MCNC: 0.92 MG/DL — SIGNIFICANT CHANGE UP (ref 0.5–1.3)
EGFR: 121 ML/MIN/1.73M2 — SIGNIFICANT CHANGE UP
EOSINOPHIL # BLD AUTO: 0.38 K/UL — SIGNIFICANT CHANGE UP (ref 0–0.5)
EOSINOPHIL NFR BLD AUTO: 6.9 % — HIGH (ref 0–6)
GIANT PLATELETS BLD QL SMEAR: PRESENT — SIGNIFICANT CHANGE UP
GLUCOSE SERPL-MCNC: 93 MG/DL — SIGNIFICANT CHANGE UP (ref 70–99)
HAPTOGLOB SERPL-MCNC: 82 MG/DL — SIGNIFICANT CHANGE UP (ref 34–200)
HCT VFR BLD CALC: 31.9 % — LOW (ref 39–50)
HGB BLD-MCNC: 11.2 G/DL — LOW (ref 13–17)
HYPOCHROMIA BLD QL: SIGNIFICANT CHANGE UP
LACTATE SERPL-SCNC: 1.5 MMOL/L — SIGNIFICANT CHANGE UP (ref 0.5–2)
LDH SERPL L TO P-CCNC: 148 U/L — SIGNIFICANT CHANGE UP (ref 50–242)
LYMPHOCYTES # BLD AUTO: 2.26 K/UL — SIGNIFICANT CHANGE UP (ref 1–3.3)
LYMPHOCYTES # BLD AUTO: 41.4 % — SIGNIFICANT CHANGE UP (ref 13–44)
MAGNESIUM SERPL-MCNC: 1.9 MG/DL — SIGNIFICANT CHANGE UP (ref 1.6–2.6)
MANUAL SMEAR VERIFICATION: SIGNIFICANT CHANGE UP
MCHC RBC-ENTMCNC: 26.1 PG — LOW (ref 27–34)
MCHC RBC-ENTMCNC: 35.1 GM/DL — SIGNIFICANT CHANGE UP (ref 32–36)
MCV RBC AUTO: 74.4 FL — LOW (ref 80–100)
MONOCYTES # BLD AUTO: 0.33 K/UL — SIGNIFICANT CHANGE UP (ref 0–0.9)
MONOCYTES NFR BLD AUTO: 6 % — SIGNIFICANT CHANGE UP (ref 2–14)
NEUTROPHILS # BLD AUTO: 2.45 K/UL — SIGNIFICANT CHANGE UP (ref 1.8–7.4)
NEUTROPHILS NFR BLD AUTO: 44.8 % — SIGNIFICANT CHANGE UP (ref 43–77)
OVALOCYTES BLD QL SMEAR: SLIGHT — SIGNIFICANT CHANGE UP
PHOSPHATE SERPL-MCNC: 4.5 MG/DL — SIGNIFICANT CHANGE UP (ref 2.5–4.5)
PLAT MORPH BLD: NORMAL — SIGNIFICANT CHANGE UP
PLATELET # BLD AUTO: 172 K/UL — SIGNIFICANT CHANGE UP (ref 150–400)
POIKILOCYTOSIS BLD QL AUTO: SIGNIFICANT CHANGE UP
POLYCHROMASIA BLD QL SMEAR: SLIGHT — SIGNIFICANT CHANGE UP
POTASSIUM SERPL-MCNC: 4 MMOL/L — SIGNIFICANT CHANGE UP (ref 3.5–5.3)
POTASSIUM SERPL-SCNC: 4 MMOL/L — SIGNIFICANT CHANGE UP (ref 3.5–5.3)
PROT SERPL-MCNC: 6.3 G/DL — SIGNIFICANT CHANGE UP (ref 6–8.3)
RBC # BLD: 4.29 M/UL — SIGNIFICANT CHANGE UP (ref 4.2–5.8)
RBC # BLD: 4.29 M/UL — SIGNIFICANT CHANGE UP (ref 4.2–5.8)
RBC # FLD: 15.9 % — HIGH (ref 10.3–14.5)
RBC BLD AUTO: ABNORMAL
RETICS #: 89.2 K/UL — SIGNIFICANT CHANGE UP (ref 25–125)
RETICS/RBC NFR: 2.1 % — SIGNIFICANT CHANGE UP (ref 0.5–2.5)
SCHISTOCYTES BLD QL AUTO: SLIGHT — SIGNIFICANT CHANGE UP
SODIUM SERPL-SCNC: 140 MMOL/L — SIGNIFICANT CHANGE UP (ref 135–145)
TARGETS BLD QL SMEAR: SIGNIFICANT CHANGE UP
TROPONIN T SERPL-MCNC: 0.01 NG/ML — SIGNIFICANT CHANGE UP (ref 0–0.01)
WBC # BLD: 5.46 K/UL — SIGNIFICANT CHANGE UP (ref 3.8–10.5)
WBC # FLD AUTO: 5.46 K/UL — SIGNIFICANT CHANGE UP (ref 3.8–10.5)

## 2022-06-24 PROCEDURE — 99221 1ST HOSP IP/OBS SF/LOW 40: CPT

## 2022-06-24 PROCEDURE — 71045 X-RAY EXAM CHEST 1 VIEW: CPT | Mod: 26

## 2022-06-24 PROCEDURE — 93010 ELECTROCARDIOGRAM REPORT: CPT

## 2022-06-24 RX ORDER — HYDROMORPHONE HYDROCHLORIDE 2 MG/ML
30 INJECTION INTRAMUSCULAR; INTRAVENOUS; SUBCUTANEOUS
Refills: 0 | Status: DISCONTINUED | OUTPATIENT
Start: 2022-06-24 | End: 2022-06-24

## 2022-06-24 RX ORDER — HYDROMORPHONE HYDROCHLORIDE 2 MG/ML
0.5 INJECTION INTRAMUSCULAR; INTRAVENOUS; SUBCUTANEOUS
Refills: 0 | Status: DISCONTINUED | OUTPATIENT
Start: 2022-06-24 | End: 2022-06-24

## 2022-06-24 RX ORDER — HYDROMORPHONE HYDROCHLORIDE 2 MG/ML
0.5 INJECTION INTRAMUSCULAR; INTRAVENOUS; SUBCUTANEOUS EVERY 4 HOURS
Refills: 0 | Status: DISCONTINUED | OUTPATIENT
Start: 2022-06-24 | End: 2022-06-24

## 2022-06-24 RX ORDER — HYDROMORPHONE HYDROCHLORIDE 2 MG/ML
1 INJECTION INTRAMUSCULAR; INTRAVENOUS; SUBCUTANEOUS ONCE
Refills: 0 | Status: DISCONTINUED | OUTPATIENT
Start: 2022-06-24 | End: 2022-06-24

## 2022-06-24 RX ORDER — HYDROMORPHONE HYDROCHLORIDE 2 MG/ML
30 INJECTION INTRAMUSCULAR; INTRAVENOUS; SUBCUTANEOUS
Refills: 0 | Status: DISCONTINUED | OUTPATIENT
Start: 2022-06-24 | End: 2022-06-25

## 2022-06-24 RX ADMIN — HYDROMORPHONE HYDROCHLORIDE 1 MILLIGRAM(S): 2 INJECTION INTRAMUSCULAR; INTRAVENOUS; SUBCUTANEOUS at 02:15

## 2022-06-24 RX ADMIN — HEPARIN SODIUM 5000 UNIT(S): 5000 INJECTION INTRAVENOUS; SUBCUTANEOUS at 05:11

## 2022-06-24 RX ADMIN — SODIUM CHLORIDE 170 MILLILITER(S): 9 INJECTION, SOLUTION INTRAVENOUS at 05:11

## 2022-06-24 RX ADMIN — HYDROMORPHONE HYDROCHLORIDE 0.5 MILLIGRAM(S): 2 INJECTION INTRAMUSCULAR; INTRAVENOUS; SUBCUTANEOUS at 05:11

## 2022-06-24 RX ADMIN — HYDROMORPHONE HYDROCHLORIDE 30 MILLILITER(S): 2 INJECTION INTRAMUSCULAR; INTRAVENOUS; SUBCUTANEOUS at 13:17

## 2022-06-24 RX ADMIN — HEPARIN SODIUM 5000 UNIT(S): 5000 INJECTION INTRAVENOUS; SUBCUTANEOUS at 22:45

## 2022-06-24 RX ADMIN — HYDROMORPHONE HYDROCHLORIDE 30 MILLILITER(S): 2 INJECTION INTRAMUSCULAR; INTRAVENOUS; SUBCUTANEOUS at 07:16

## 2022-06-24 RX ADMIN — HYDROMORPHONE HYDROCHLORIDE 0.5 MILLIGRAM(S): 2 INJECTION INTRAMUSCULAR; INTRAVENOUS; SUBCUTANEOUS at 06:00

## 2022-06-24 RX ADMIN — HYDROXYUREA 1000 MILLIGRAM(S): 500 CAPSULE ORAL at 11:29

## 2022-06-24 RX ADMIN — Medication 1 MILLIGRAM(S): at 11:29

## 2022-06-24 RX ADMIN — HYDROMORPHONE HYDROCHLORIDE 1 MILLIGRAM(S): 2 INJECTION INTRAMUSCULAR; INTRAVENOUS; SUBCUTANEOUS at 01:18

## 2022-06-24 RX ADMIN — HYDROMORPHONE HYDROCHLORIDE 30 MILLILITER(S): 2 INJECTION INTRAMUSCULAR; INTRAVENOUS; SUBCUTANEOUS at 23:30

## 2022-06-24 RX ADMIN — Medication 15 MILLIGRAM(S): at 00:05

## 2022-06-24 RX ADMIN — HYDROMORPHONE HYDROCHLORIDE 30 MILLILITER(S): 2 INJECTION INTRAMUSCULAR; INTRAVENOUS; SUBCUTANEOUS at 09:50

## 2022-06-24 RX ADMIN — HYDROMORPHONE HYDROCHLORIDE 30 MILLILITER(S): 2 INJECTION INTRAMUSCULAR; INTRAVENOUS; SUBCUTANEOUS at 15:39

## 2022-06-24 RX ADMIN — HEPARIN SODIUM 5000 UNIT(S): 5000 INJECTION INTRAVENOUS; SUBCUTANEOUS at 13:17

## 2022-06-24 NOTE — PROGRESS NOTE ADULT - SUBJECTIVE AND OBJECTIVE BOX
Transfer Surgery to Medicine  21M PM sickle cell, asthma, no PSHx presented with 3 days of worsening diarrhea, po intolerance, nausea/emesis, and crampy lower abdominal pain. He also went to [place] 2 days ago, but declined admission because he had family matters to deal with. Was called 1 day ago and told that his stool was positive for E. Coli. His most recent sickle cell crisis was in April, and he typically has crises when the seasons change. His typical crisis symptoms include joint and extremity pain, but he denies any abdominal pain w/ crises. In the ED, VSS and WBC 6.5. CT with diffuse colonic wall thickening and hypoattenuation c/w colitis, appendix base dilation w/ borderline wall thickening. Pt was admitted for observation and repeat stool studies showed EPEC and EAEC and pt was given azithromycin 1g x 1. Pt began to c/o leg pain 2/2 sickle cell crisis, per outpt Hematologist, Dr. Singh, pt take Dilaudid 4mg PO at home PRN for pain and if pain not controlled gets admitted for IV dilaudid 2mg x 3 q1 hr followed by dilaudid PCA pump with toradol. . Pt clinically does not have appendicitis and not a surgical candidate. On 6/23 pt was transferred to medicine as for sickle cell crisis and ecoli colitis.    INTERVAL HPI/OVERNIGHT EVENTS:  Patient was seen and examined at bedside. As per nurse and patient, no o/n events, patient resting comfortably. No complaints at this time. Patient denies: fever, chills, dizziness, weakness, HA, Changes in vision, CP, palpitations, SOB, cough, N/V/D/C, dysuria, changes in bowel movements, LE edema. ROS otherwise negative.    VITAL SIGNS:  T(F): 97.4 (06-23-22 @ 20:10)  HR: 61 (06-23-22 @ 20:10)  BP: 127/65 (06-23-22 @ 20:10)  RR: 16 (06-23-22 @ 20:10)  SpO2: 100% (06-23-22 @ 20:10)  Wt(kg): --    PHYSICAL EXAM:    Constitutional:   HEENT: PERRL, EOMI, sclera non-icteric, neck supple, trachea midline, no masses, no JVD, MMM, good dentition  Respiratory: CTA b/l, good air entry b/l, no wheezing, no rhonchi, no rales, without accessory muscle use and no intercostal retractions  Cardiovascular: RRR, normal S1S2, no M/R/G  Gastrointestinal: soft, NTND, no masses palpable, BS normal  Extremities: Warm, well perfused, pulses equal bilateral upper and lower extremities, no edema, no clubbing. Capillary refill <2 sec  Neurological: AAOx3, CN Grossly intact  Skin: Normal temperature, warm, dry    MEDICATIONS  (STANDING):  dextrose 5% + sodium chloride 0.45%. 1000 milliLiter(s) (170 mL/Hr) IV Continuous <Continuous>  folic acid 1 milliGRAM(s) Oral daily  heparin   Injectable 5000 Unit(s) SubCutaneous every 8 hours  HYDROmorphone PCA (1 mG/mL) 30 milliLiter(s) PCA Continuous PCA Continuous  hydroxyurea (Non - oncologic) 1000 milliGRAM(s) Oral daily    MEDICATIONS  (PRN):  acetaminophen     Tablet .. 1000 milliGRAM(s) Oral every 6 hours PRN Mild Pain (1 - 3)  ALBUTerol    90 MICROgram(s) HFA Inhaler 2 Puff(s) Inhalation every 6 hours PRN Shortness of Breath and/or Wheezing  ketorolac   Injectable 15 milliGRAM(s) IV Push every 6 hours PRN Severe Pain (7 - 10)      Allergies    No Known Drug Allergies  peanuts (Unknown)    Intolerances        LABS:                        10.4   5.56  )-----------( 177      ( 23 Jun 2022 07:13 )             29.8     06-23    143  |  108  |  6<L>  ----------------------------<  81  3.5   |  24  |  0.93    Ca    8.7      23 Jun 2022 07:13  Phos  4.7     06-23  Mg     1.9     06-23    TPro  7.1  /  Alb  4.1  /  TBili  1.1  /  DBili  x   /  AST  14  /  ALT  10  /  AlkPhos  73  06-22    PT/INR - ( 22 Jun 2022 21:44 )   PT: 15.2 sec;   INR: 1.27          PTT - ( 22 Jun 2022 21:44 )  PTT:33.1 sec      RADIOLOGY & ADDITIONAL TESTS:  Reviewed Transfer Surgery to Medicine  21M PM sickle cell, asthma, no PSHx presented with 3 days of worsening diarrhea, po intolerance, nausea/emesis, and crampy lower abdominal pain. He also went to [place] 2 days ago, but declined admission because he had family matters to deal with. Was called 1 day ago and told that his stool was positive for E. Coli. His most recent sickle cell crisis was in April, and he typically has crises when the seasons change. His typical crisis symptoms include joint and extremity pain, but he denies any abdominal pain w/ crises. In the ED, VSS and WBC 6.5. CT with diffuse colonic wall thickening and hypoattenuation c/w colitis, appendix base dilation w/ borderline wall thickening. Pt was admitted for observation and repeat stool studies showed EPEC and EAEC and pt was given azithromycin 1g x 1. Pt began to c/o leg pain 2/2 sickle cell crisis, per outpt Hematologist, Dr. Singh, pt take Dilaudid 4mg PO at home PRN for pain and if pain not controlled gets admitted for IV dilaudid 2mg x 3 q1 hr followed by dilaudid PCA pump with toradol. . Pt clinically does not have appendicitis and not a surgical candidate. On 6/23 pt was transferred to medicine as for sickle cell crisis and ecoli colitis.    INTERVAL HPI/OVERNIGHT EVENTS:  Patient was seen and examined at bedside. As per nurse and patient, no o/n events, patient resting comfortably.   VITAL SIGNS:  T(F): 97.4 (06-23-22 @ 20:10)  HR: 61 (06-23-22 @ 20:10)  BP: 127/65 (06-23-22 @ 20:10)  RR: 16 (06-23-22 @ 20:10)  SpO2: 100% (06-23-22 @ 20:10)  Wt(kg): --    PHYSICAL EXAM:    Constitutional:   HEENT: PERRL, EOMI, sclera non-icteric, neck supple, trachea midline, no masses, no JVD, MMM, good dentition  Respiratory: CTA b/l, good air entry b/l, no wheezing, no rhonchi, no rales, without accessory muscle use and no intercostal retractions  Cardiovascular: RRR, normal S1S2, no M/R/G  Gastrointestinal: soft, NTND, no masses palpable, BS normal  Extremities: Warm, well perfused, pulses equal bilateral upper and lower extremities, no edema, no clubbing. Capillary refill <2 sec  Neurological: AAOx3, CN Grossly intact  Skin: Normal temperature, warm, dry    MEDICATIONS  (STANDING):  dextrose 5% + sodium chloride 0.45%. 1000 milliLiter(s) (170 mL/Hr) IV Continuous <Continuous>  folic acid 1 milliGRAM(s) Oral daily  heparin   Injectable 5000 Unit(s) SubCutaneous every 8 hours  HYDROmorphone PCA (1 mG/mL) 30 milliLiter(s) PCA Continuous PCA Continuous  hydroxyurea (Non - oncologic) 1000 milliGRAM(s) Oral daily    MEDICATIONS  (PRN):  acetaminophen     Tablet .. 1000 milliGRAM(s) Oral every 6 hours PRN Mild Pain (1 - 3)  ALBUTerol    90 MICROgram(s) HFA Inhaler 2 Puff(s) Inhalation every 6 hours PRN Shortness of Breath and/or Wheezing  ketorolac   Injectable 15 milliGRAM(s) IV Push every 6 hours PRN Severe Pain (7 - 10)      Allergies    No Known Drug Allergies  peanuts (Unknown)    Intolerances        LABS:                        10.4   5.56  )-----------( 177      ( 23 Jun 2022 07:13 )             29.8     06-23    143  |  108  |  6<L>  ----------------------------<  81  3.5   |  24  |  0.93    Ca    8.7      23 Jun 2022 07:13  Phos  4.7     06-23  Mg     1.9     06-23    TPro  7.1  /  Alb  4.1  /  TBili  1.1  /  DBili  x   /  AST  14  /  ALT  10  /  AlkPhos  73  06-22    PT/INR - ( 22 Jun 2022 21:44 )   PT: 15.2 sec;   INR: 1.27          PTT - ( 22 Jun 2022 21:44 )  PTT:33.1 sec      RADIOLOGY & ADDITIONAL TESTS:  Reviewed

## 2022-06-24 NOTE — PROGRESS NOTE ADULT - SUBJECTIVE AND OBJECTIVE BOX
SUBJECTIVE: Pt seen and examined by chief resident. Pt is doing well, resting comfortably on bed. Reports persistent pain in the legs and continued epigastric pain. Denies diarrhea overnight. No nausea or vomiting. Tolerated regular diet last night.     Vital Signs Last 24 Hrs  T(C): 36.4 (24 Jun 2022 05:02), Max: 36.7 (23 Jun 2022 12:29)  T(F): 97.6 (24 Jun 2022 05:02), Max: 98 (23 Jun 2022 12:29)  HR: 51 (24 Jun 2022 05:02) (50 - 61)  BP: 121/73 (24 Jun 2022 05:02) (121/73 - 138/63)  BP(mean): --  RR: 18 (24 Jun 2022 05:02) (16 - 18)  SpO2: 100% (24 Jun 2022 05:02) (100% - 100%)    I&O's Summary    23 Jun 2022 07:01  -  24 Jun 2022 07:00  --------------------------------------------------------  IN: 3157 mL / OUT: 1700 mL / NET: 1457 mL    Physical Exam:  General Appearance: Appears well, NAD  Pulmonary: Nonlabored breathing, no respiratory distress  Abdomen: Soft, nondisteded, tender in the epigastrium, no rebound or guarding, no RLQ tenderness   Extremities: WWP, SCD's in place     LABS:                        11.2   5.46  )-----------( 172      ( 24 Jun 2022 05:37 )             31.9     06-24    140  |  106  |  7   ----------------------------<  93  4.0   |  25  |  0.92    Ca    8.5      24 Jun 2022 05:37  Phos  4.5     06-24  Mg     1.9     06-24    TPro  6.3  /  Alb  3.7  /  TBili  1.0  /  DBili  x   /  AST  12  /  ALT  9<L>  /  AlkPhos  67  06-24    PT/INR - ( 22 Jun 2022 21:44 )   PT: 15.2 sec;   INR: 1.27          PTT - ( 22 Jun 2022 21:44 )  PTT:33.1 sec

## 2022-06-24 NOTE — PROGRESS NOTE ADULT - ATTENDING COMMENTS
Agree with assessment and plan as documented by resident.   --will increase basal pca dosing   --monitor for hypoxia and chest pain; h/h stable  --diarrhea improving; f/u c.diff

## 2022-06-25 LAB
ALBUMIN SERPL ELPH-MCNC: 3.8 G/DL — SIGNIFICANT CHANGE UP (ref 3.3–5)
ALP SERPL-CCNC: 71 U/L — SIGNIFICANT CHANGE UP (ref 40–120)
ALT FLD-CCNC: 10 U/L — SIGNIFICANT CHANGE UP (ref 10–45)
ANION GAP SERPL CALC-SCNC: 9 MMOL/L — SIGNIFICANT CHANGE UP (ref 5–17)
AST SERPL-CCNC: 13 U/L — SIGNIFICANT CHANGE UP (ref 10–40)
BASOPHILS # BLD AUTO: 0.03 K/UL — SIGNIFICANT CHANGE UP (ref 0–0.2)
BASOPHILS NFR BLD AUTO: 0.6 % — SIGNIFICANT CHANGE UP (ref 0–2)
BILIRUB SERPL-MCNC: 1 MG/DL — SIGNIFICANT CHANGE UP (ref 0.2–1.2)
BUN SERPL-MCNC: 9 MG/DL — SIGNIFICANT CHANGE UP (ref 7–23)
CALCIUM SERPL-MCNC: 8.9 MG/DL — SIGNIFICANT CHANGE UP (ref 8.4–10.5)
CHLORIDE SERPL-SCNC: 106 MMOL/L — SIGNIFICANT CHANGE UP (ref 96–108)
CO2 SERPL-SCNC: 24 MMOL/L — SIGNIFICANT CHANGE UP (ref 22–31)
CREAT SERPL-MCNC: 0.88 MG/DL — SIGNIFICANT CHANGE UP (ref 0.5–1.3)
CULTURE RESULTS: SIGNIFICANT CHANGE UP
CULTURE RESULTS: SIGNIFICANT CHANGE UP
EGFR: 125 ML/MIN/1.73M2 — SIGNIFICANT CHANGE UP
EOSINOPHIL # BLD AUTO: 0.54 K/UL — HIGH (ref 0–0.5)
EOSINOPHIL NFR BLD AUTO: 10.4 % — HIGH (ref 0–6)
GLUCOSE SERPL-MCNC: 96 MG/DL — SIGNIFICANT CHANGE UP (ref 70–99)
HCT VFR BLD CALC: 33.4 % — LOW (ref 39–50)
HGB BLD-MCNC: 11.7 G/DL — LOW (ref 13–17)
IMM GRANULOCYTES NFR BLD AUTO: 0.2 % — SIGNIFICANT CHANGE UP (ref 0–1.5)
LYMPHOCYTES # BLD AUTO: 1.9 K/UL — SIGNIFICANT CHANGE UP (ref 1–3.3)
LYMPHOCYTES # BLD AUTO: 36.6 % — SIGNIFICANT CHANGE UP (ref 13–44)
MAGNESIUM SERPL-MCNC: 2 MG/DL — SIGNIFICANT CHANGE UP (ref 1.6–2.6)
MCHC RBC-ENTMCNC: 26.1 PG — LOW (ref 27–34)
MCHC RBC-ENTMCNC: 35 GM/DL — SIGNIFICANT CHANGE UP (ref 32–36)
MCV RBC AUTO: 74.4 FL — LOW (ref 80–100)
MONOCYTES # BLD AUTO: 0.42 K/UL — SIGNIFICANT CHANGE UP (ref 0–0.9)
MONOCYTES NFR BLD AUTO: 8.1 % — SIGNIFICANT CHANGE UP (ref 2–14)
NEUTROPHILS # BLD AUTO: 2.29 K/UL — SIGNIFICANT CHANGE UP (ref 1.8–7.4)
NEUTROPHILS NFR BLD AUTO: 44.1 % — SIGNIFICANT CHANGE UP (ref 43–77)
NRBC # BLD: 0 /100 WBCS — SIGNIFICANT CHANGE UP (ref 0–0)
PHOSPHATE SERPL-MCNC: 5.1 MG/DL — HIGH (ref 2.5–4.5)
PLATELET # BLD AUTO: 167 K/UL — SIGNIFICANT CHANGE UP (ref 150–400)
POTASSIUM SERPL-MCNC: 4.3 MMOL/L — SIGNIFICANT CHANGE UP (ref 3.5–5.3)
POTASSIUM SERPL-SCNC: 4.3 MMOL/L — SIGNIFICANT CHANGE UP (ref 3.5–5.3)
PROT SERPL-MCNC: 6.8 G/DL — SIGNIFICANT CHANGE UP (ref 6–8.3)
RBC # BLD: 4.49 M/UL — SIGNIFICANT CHANGE UP (ref 4.2–5.8)
RBC # FLD: 15.6 % — HIGH (ref 10.3–14.5)
SODIUM SERPL-SCNC: 139 MMOL/L — SIGNIFICANT CHANGE UP (ref 135–145)
SPECIMEN SOURCE: SIGNIFICANT CHANGE UP
SPECIMEN SOURCE: SIGNIFICANT CHANGE UP
WBC # BLD: 5.19 K/UL — SIGNIFICANT CHANGE UP (ref 3.8–10.5)
WBC # FLD AUTO: 5.19 K/UL — SIGNIFICANT CHANGE UP (ref 3.8–10.5)

## 2022-06-25 PROCEDURE — 99233 SBSQ HOSP IP/OBS HIGH 50: CPT | Mod: GC

## 2022-06-25 RX ORDER — HYDROMORPHONE HYDROCHLORIDE 2 MG/ML
30 INJECTION INTRAMUSCULAR; INTRAVENOUS; SUBCUTANEOUS
Refills: 0 | Status: DISCONTINUED | OUTPATIENT
Start: 2022-06-25 | End: 2022-06-26

## 2022-06-25 RX ORDER — HYDROMORPHONE HYDROCHLORIDE 2 MG/ML
30 INJECTION INTRAMUSCULAR; INTRAVENOUS; SUBCUTANEOUS
Refills: 0 | Status: DISCONTINUED | OUTPATIENT
Start: 2022-06-25 | End: 2022-06-25

## 2022-06-25 RX ORDER — SODIUM CHLORIDE 9 MG/ML
1000 INJECTION INTRAMUSCULAR; INTRAVENOUS; SUBCUTANEOUS
Refills: 0 | Status: DISCONTINUED | OUTPATIENT
Start: 2022-06-25 | End: 2022-06-26

## 2022-06-25 RX ADMIN — HYDROMORPHONE HYDROCHLORIDE 30 MILLILITER(S): 2 INJECTION INTRAMUSCULAR; INTRAVENOUS; SUBCUTANEOUS at 11:05

## 2022-06-25 RX ADMIN — HEPARIN SODIUM 5000 UNIT(S): 5000 INJECTION INTRAVENOUS; SUBCUTANEOUS at 13:10

## 2022-06-25 RX ADMIN — HEPARIN SODIUM 5000 UNIT(S): 5000 INJECTION INTRAVENOUS; SUBCUTANEOUS at 21:04

## 2022-06-25 RX ADMIN — HYDROMORPHONE HYDROCHLORIDE 30 MILLILITER(S): 2 INJECTION INTRAMUSCULAR; INTRAVENOUS; SUBCUTANEOUS at 06:39

## 2022-06-25 RX ADMIN — Medication 1 MILLIGRAM(S): at 12:40

## 2022-06-25 RX ADMIN — SODIUM CHLORIDE 170 MILLILITER(S): 9 INJECTION, SOLUTION INTRAVENOUS at 14:30

## 2022-06-25 RX ADMIN — SODIUM CHLORIDE 170 MILLILITER(S): 9 INJECTION, SOLUTION INTRAVENOUS at 08:30

## 2022-06-25 RX ADMIN — HYDROXYUREA 1000 MILLIGRAM(S): 500 CAPSULE ORAL at 12:40

## 2022-06-25 RX ADMIN — HEPARIN SODIUM 5000 UNIT(S): 5000 INJECTION INTRAVENOUS; SUBCUTANEOUS at 06:30

## 2022-06-25 RX ADMIN — SODIUM CHLORIDE 125 MILLILITER(S): 9 INJECTION INTRAMUSCULAR; INTRAVENOUS; SUBCUTANEOUS at 21:05

## 2022-06-25 NOTE — PROGRESS NOTE ADULT - ASSESSMENT
21M PMH sickle cell, asthma, no PSHx presented with 3 days of worsening diarrhea, po intolerance, nausea/emesis, and crampy lower abdominal pain found to be in sickle cell crisis and E coli diarrhea
21M PMH sickle cell, asthma, no PSHx presented with 3 days of worsening diarrhea, po intolerance, nausea/emesis, and crampy lower abdominal pain. He also went to [place] 2 days ago, but declined admission because he had family matters to deal with. Was called 1 day ago and told that his stool was positive for E. Coli. His most recent sickle cell crisis was in April, and he typically has crises when the seasons change. His typical crisis symptoms include joint and extremity pain, but he denies any abdominal pain w/ crises. In the ED, VSS and WBC 6.5. CT with diffuse colonic wall thickening and hypoattenuation c/w colitis, appendix base dilation w/ borderline wall thickening. Pt was admitted for observation and repeat stool studies showed EPEC and EAEC and pt was given azithromycin 1g x 1. Pt clinically continues to not have appendicitis and has no surgical needs.     Regular diet  Pain/nausea control  Plan as per primary team   reconsult if pt has worsening abdominal pain or change in exam     Discussed with chief resident and attending on call
21M PMH sickle cell, asthma, no PSHx presented with 3 days of worsening diarrhea, po intolerance, nausea/emesis, and crampy lower abdominal pain found to be in sickle cell crisis and E coli diarrhea

## 2022-06-25 NOTE — PROGRESS NOTE ADULT - SUBJECTIVE AND OBJECTIVE BOX
OVERNIGHT EVENTS:  No acute events overnight    SUBJECTIVE:  Patient seen and examined at bedside.  Patient reports leg pain stable to slightly improved today, abdominal pain improved.  Diarrhea decreasing and tolerating more PO intake.  Denies fevers, chills, CP, SOB.    Vital Signs Last 12 Hrs  T(F): 98.2 (06-25-22 @ 16:48), Max: 98.3 (06-25-22 @ 13:32)  HR: 69 (06-25-22 @ 16:48) (68 - 69)  BP: 130/70 (06-25-22 @ 16:48) (130/70 - 134/79)  BP(mean): --  RR: 18 (06-25-22 @ 16:48) (18 - 18)  SpO2: 99% (06-25-22 @ 16:48) (97% - 99%)  I&O's Summary    24 Jun 2022 07:01  -  25 Jun 2022 07:00  --------------------------------------------------------  IN: 4100 mL / OUT: 2650 mL / NET: 1450 mL    25 Jun 2022 07:01  -  25 Jun 2022 19:26  --------------------------------------------------------  IN: 2400 mL / OUT: 2000 mL / NET: 400 mL        PHYSICAL EXAM:  Constitutional: NAD, comfortable in bed.  HEENT: NC/AT, PERRLA, EOMI, no conjunctival pallor or scleral icterus, MMM  Neck: Supple, no JVD  Respiratory: CTA B/L. No w/r/r.   Cardiovascular: RRR, normal S1 and S2, no m/r/g.   Gastrointestinal: +BS, soft NTND, no guarding or rebound tenderness, no palpable masses   Extremities: wwp; no cyanosis, clubbing or edema.   Vascular: Pulses equal and strong throughout.   Neurological: AAOx3, no CN deficits, strength and sensation intact throughout.   Skin: No gross skin abnormalities or rashes        LABS:                        11.7   5.19  )-----------( 167      ( 25 Jun 2022 08:27 )             33.4     06-25    139  |  106  |  9   ----------------------------<  96  4.3   |  24  |  0.88    Ca    8.9      25 Jun 2022 08:27  Phos  5.1     06-25  Mg     2.0     06-25    TPro  6.8  /  Alb  3.8  /  TBili  1.0  /  DBili  x   /  AST  13  /  ALT  10  /  AlkPhos  71  06-25            RADIOLOGY & ADDITIONAL TESTS:    MEDICATIONS  (STANDING):  dextrose 5% + sodium chloride 0.45%. 1000 milliLiter(s) (170 mL/Hr) IV Continuous <Continuous>  folic acid 1 milliGRAM(s) Oral daily  heparin   Injectable 5000 Unit(s) SubCutaneous every 8 hours  HYDROmorphone PCA (1 mG/mL) 30 milliLiter(s) PCA Continuous PCA Continuous  hydroxyurea (Non - oncologic) 1000 milliGRAM(s) Oral daily    MEDICATIONS  (PRN):  ALBUTerol    90 MICROgram(s) HFA Inhaler 2 Puff(s) Inhalation every 6 hours PRN Shortness of Breath and/or Wheezing

## 2022-06-25 NOTE — PROGRESS NOTE ADULT - PROBLEM SELECTOR PLAN 2
Increase in frequency and volume of diarrhea over the last 5 days. Now having >5 watery bowel movements a day with abdominal pain   GI PCR showing E coli --- EAEC. EPEC  - s/p  azithromycin PO 1g x 1--may shorten duration of symptoms  - f/u C diff stool toxin to r/o co-infection  - will consider pausing hydroxyurea depending on severity of symptoms tomorrow
Increase in frequency and volume of diarrhea over the last 5 days. Now having >5 watery bowel movements a day with abdominal pain   GI PCR showing E coli --- EAEC. EPEC  - s/p  azithromycin PO 1g x 1--may shorten duration of symptoms  - C diff negative   - will consider pausing hydroxyurea depending on severity of symptoms tomorrow

## 2022-06-25 NOTE — PROGRESS NOTE ADULT - PROBLEM SELECTOR PLAN 1
Currently without any dyspnea, no fever. No signs of acute chest.   Monitor for acute chest- If patient has CXR findings AND fever or respiratory symptoms, would start albuterol neb q6, transfuse to goal Hgb 9 and consult hematology   6/23 CXR PA/Lat no acute cardiopulmonary disease  - c/w folic acid and hydroxyurea  - montior CMP, LDH, haptoglobin, reticulocyte count.  WNL
Currently without any dyspnea, no fever. No signs of acute chest.   Monitor for acute chest- If patient has CXR findings AND fever or respiratory symptoms, would start albuterol neb q6, transfuse to goal Hgb 9 and consult hematology   6/23 CXR PA/Lat no acute cardiopulmonary disease  - c/w folic acid and hydroxyurea  - montior CMP, LDH, haptoglobin, reticulocyte count

## 2022-06-25 NOTE — PROGRESS NOTE ADULT - PROBLEM SELECTOR PLAN 4
F: 170mL/hr D5+0.45NS  E: replete K<4, Mg<2  N: Regular  DVT ppx: heparin subq
F: 170mL/hr D5+0.45NS  E: replete K<4, Mg<2  N: Regular  DVT ppx: heparin subq

## 2022-06-26 VITALS
TEMPERATURE: 98 F | DIASTOLIC BLOOD PRESSURE: 84 MMHG | RESPIRATION RATE: 17 BRPM | OXYGEN SATURATION: 97 % | SYSTOLIC BLOOD PRESSURE: 144 MMHG | HEART RATE: 74 BPM

## 2022-06-26 LAB
ALBUMIN SERPL ELPH-MCNC: 4 G/DL — SIGNIFICANT CHANGE UP (ref 3.3–5)
ALP SERPL-CCNC: 67 U/L — SIGNIFICANT CHANGE UP (ref 40–120)
ALT FLD-CCNC: 10 U/L — SIGNIFICANT CHANGE UP (ref 10–45)
ANION GAP SERPL CALC-SCNC: 10 MMOL/L — SIGNIFICANT CHANGE UP (ref 5–17)
AST SERPL-CCNC: 14 U/L — SIGNIFICANT CHANGE UP (ref 10–40)
BILIRUB SERPL-MCNC: 1.1 MG/DL — SIGNIFICANT CHANGE UP (ref 0.2–1.2)
BLD GP AB SCN SERPL QL: NEGATIVE — SIGNIFICANT CHANGE UP
BUN SERPL-MCNC: 6 MG/DL — LOW (ref 7–23)
CALCIUM SERPL-MCNC: 8.6 MG/DL — SIGNIFICANT CHANGE UP (ref 8.4–10.5)
CHLORIDE SERPL-SCNC: 103 MMOL/L — SIGNIFICANT CHANGE UP (ref 96–108)
CO2 SERPL-SCNC: 24 MMOL/L — SIGNIFICANT CHANGE UP (ref 22–31)
CREAT SERPL-MCNC: 0.83 MG/DL — SIGNIFICANT CHANGE UP (ref 0.5–1.3)
EGFR: 127 ML/MIN/1.73M2 — SIGNIFICANT CHANGE UP
GLUCOSE SERPL-MCNC: 91 MG/DL — SIGNIFICANT CHANGE UP (ref 70–99)
HAPTOGLOB SERPL-MCNC: 70 MG/DL — SIGNIFICANT CHANGE UP (ref 34–200)
HCT VFR BLD CALC: 33.2 % — LOW (ref 39–50)
HGB BLD-MCNC: 11.7 G/DL — LOW (ref 13–17)
LDH SERPL L TO P-CCNC: 150 U/L — SIGNIFICANT CHANGE UP (ref 50–242)
MAGNESIUM SERPL-MCNC: 1.8 MG/DL — SIGNIFICANT CHANGE UP (ref 1.6–2.6)
MCHC RBC-ENTMCNC: 26.1 PG — LOW (ref 27–34)
MCHC RBC-ENTMCNC: 35.2 GM/DL — SIGNIFICANT CHANGE UP (ref 32–36)
MCV RBC AUTO: 73.9 FL — LOW (ref 80–100)
NRBC # BLD: 0 /100 WBCS — SIGNIFICANT CHANGE UP (ref 0–0)
PHOSPHATE SERPL-MCNC: 4.8 MG/DL — HIGH (ref 2.5–4.5)
PLATELET # BLD AUTO: 142 K/UL — LOW (ref 150–400)
POTASSIUM SERPL-MCNC: 4.2 MMOL/L — SIGNIFICANT CHANGE UP (ref 3.5–5.3)
POTASSIUM SERPL-SCNC: 4.2 MMOL/L — SIGNIFICANT CHANGE UP (ref 3.5–5.3)
PROT SERPL-MCNC: 6.7 G/DL — SIGNIFICANT CHANGE UP (ref 6–8.3)
RBC # BLD: 4.49 M/UL — SIGNIFICANT CHANGE UP (ref 4.2–5.8)
RBC # BLD: 4.49 M/UL — SIGNIFICANT CHANGE UP (ref 4.2–5.8)
RBC # FLD: 15.6 % — HIGH (ref 10.3–14.5)
RETICS #: 67.8 K/UL — SIGNIFICANT CHANGE UP (ref 25–125)
RETICS/RBC NFR: 1.5 % — SIGNIFICANT CHANGE UP (ref 0.5–2.5)
RH IG SCN BLD-IMP: POSITIVE — SIGNIFICANT CHANGE UP
SODIUM SERPL-SCNC: 137 MMOL/L — SIGNIFICANT CHANGE UP (ref 135–145)
WBC # BLD: 4.76 K/UL — SIGNIFICANT CHANGE UP (ref 3.8–10.5)
WBC # FLD AUTO: 4.76 K/UL — SIGNIFICANT CHANGE UP (ref 3.8–10.5)

## 2022-06-26 PROCEDURE — 74177 CT ABD & PELVIS W/CONTRAST: CPT | Mod: MG

## 2022-06-26 PROCEDURE — 83010 ASSAY OF HAPTOGLOBIN QUANT: CPT

## 2022-06-26 PROCEDURE — 83690 ASSAY OF LIPASE: CPT

## 2022-06-26 PROCEDURE — 85730 THROMBOPLASTIN TIME PARTIAL: CPT

## 2022-06-26 PROCEDURE — 86900 BLOOD TYPING SEROLOGIC ABO: CPT

## 2022-06-26 PROCEDURE — 80048 BASIC METABOLIC PNL TOTAL CA: CPT

## 2022-06-26 PROCEDURE — 71045 X-RAY EXAM CHEST 1 VIEW: CPT

## 2022-06-26 PROCEDURE — 82550 ASSAY OF CK (CPK): CPT

## 2022-06-26 PROCEDURE — 86850 RBC ANTIBODY SCREEN: CPT

## 2022-06-26 PROCEDURE — 84100 ASSAY OF PHOSPHORUS: CPT

## 2022-06-26 PROCEDURE — 86901 BLOOD TYPING SEROLOGIC RH(D): CPT

## 2022-06-26 PROCEDURE — 82553 CREATINE MB FRACTION: CPT

## 2022-06-26 PROCEDURE — 71046 X-RAY EXAM CHEST 2 VIEWS: CPT

## 2022-06-26 PROCEDURE — 83735 ASSAY OF MAGNESIUM: CPT

## 2022-06-26 PROCEDURE — 87046 STOOL CULTR AEROBIC BACT EA: CPT

## 2022-06-26 PROCEDURE — 85027 COMPLETE CBC AUTOMATED: CPT

## 2022-06-26 PROCEDURE — 80053 COMPREHEN METABOLIC PANEL: CPT

## 2022-06-26 PROCEDURE — 96374 THER/PROPH/DIAG INJ IV PUSH: CPT

## 2022-06-26 PROCEDURE — 87324 CLOSTRIDIUM AG IA: CPT

## 2022-06-26 PROCEDURE — 85045 AUTOMATED RETICULOCYTE COUNT: CPT

## 2022-06-26 PROCEDURE — 93005 ELECTROCARDIOGRAM TRACING: CPT

## 2022-06-26 PROCEDURE — 87507 IADNA-DNA/RNA PROBE TQ 12-25: CPT

## 2022-06-26 PROCEDURE — 87635 SARS-COV-2 COVID-19 AMP PRB: CPT

## 2022-06-26 PROCEDURE — 36415 COLL VENOUS BLD VENIPUNCTURE: CPT

## 2022-06-26 PROCEDURE — 85025 COMPLETE CBC W/AUTO DIFF WBC: CPT

## 2022-06-26 PROCEDURE — 85610 PROTHROMBIN TIME: CPT

## 2022-06-26 PROCEDURE — 99285 EMERGENCY DEPT VISIT HI MDM: CPT | Mod: 25

## 2022-06-26 PROCEDURE — G1004: CPT

## 2022-06-26 PROCEDURE — 87045 FECES CULTURE AEROBIC BACT: CPT

## 2022-06-26 PROCEDURE — 83605 ASSAY OF LACTIC ACID: CPT

## 2022-06-26 PROCEDURE — 83615 LACTATE (LD) (LDH) ENZYME: CPT

## 2022-06-26 PROCEDURE — 87449 NOS EACH ORGANISM AG IA: CPT

## 2022-06-26 PROCEDURE — 84484 ASSAY OF TROPONIN QUANT: CPT

## 2022-06-26 RX ORDER — HYDROMORPHONE HYDROCHLORIDE 2 MG/ML
30 INJECTION INTRAMUSCULAR; INTRAVENOUS; SUBCUTANEOUS
Refills: 0 | Status: DISCONTINUED | OUTPATIENT
Start: 2022-06-26 | End: 2022-06-26

## 2022-06-26 RX ORDER — HYDROMORPHONE HYDROCHLORIDE 2 MG/ML
4 INJECTION INTRAMUSCULAR; INTRAVENOUS; SUBCUTANEOUS EVERY 4 HOURS
Refills: 0 | Status: DISCONTINUED | OUTPATIENT
Start: 2022-06-26 | End: 2022-06-26

## 2022-06-26 RX ADMIN — HEPARIN SODIUM 5000 UNIT(S): 5000 INJECTION INTRAVENOUS; SUBCUTANEOUS at 05:01

## 2022-06-26 NOTE — CONSULT NOTE ADULT - SUBJECTIVE AND OBJECTIVE BOX
LENGTH OF HOSPITAL STAY: 3d    CHIEF COMPLAINT:   Patient is a 21y old  Male who presents with a chief complaint of r/o appendicitis (25 Jun 2022 19:26)    HISTORY OF PRESENTING ILLNESS:   21M PMH sickle cell, asthma, no PSHx presents with 3 days of worsening diarrhea, po intolerance, nausea/emesis, and crampy lower abdominal pain. He also went to [place] 2 days ago, but declined admission because he had family matters to deal with. Was called 1 day ago and told that his stool was positive for E. Coli. Patient reports that his BMs began as loose stool, but now are completely liquid. He throws up if he eats solid food, but he is able to keep liquids down. He reports that he does feel some hunger. BMs were previously normal, regular, no blood. He feels a cramping lower abdominal pain bilaterally before BMs, and he intermittently has crampy lower abdominal pain at rest. Denies fever, constipation, SOB, dysuria. Endorses chills and mild chest pain at the start of symptoms which is now resolved. Never had cscope or EGD. His most recent sickle cell crisis was in April, and he typically has crises when the seasons change. His typical crisis symptoms include joint and extremity pain, but he denies any abdominal pain w/ crises.     PMH: sickle cell, asthma  PSHx: none  FamHx: aunt gastrointestinal problems requiring surgery, unsure if IBD   All: peanuts  Meds: hydroxyurea 1000 daily, monthly infusions, folic acid, hydromorphone 4mg prn, vitamin D 50,000U weekly, ibuprofen 600 prn, albuterol   SocHx: no tobacco use, no EtOH, works as a dance artist     (23 Jun 2022 02:41)    PAST MEDICAL & SURGICAL HISTORY:  Sickle cell anemia  Asthma  No significant past surgical history    SOCIAL HISTORY:    ALLERGIES:  No Known Drug Allergies  peanuts (Unknown)    MEDICATIONS:  STANDING MEDICATIONS  folic acid 1 milliGRAM(s) Oral daily  heparin   Injectable 5000 Unit(s) SubCutaneous every 8 hours  hydroxyurea (Non - oncologic) 1000 milliGRAM(s) Oral daily    PRN MEDICATIONS  ALBUTerol    90 MICROgram(s) HFA Inhaler 2 Puff(s) Inhalation every 6 hours PRN  HYDROmorphone   Tablet 4 milliGRAM(s) Oral every 4 hours PRN    VITALS:   T(F): 98.1  HR: 74  BP: 144/84  RR: 17  SpO2: 97%    LABS:                        11.7   4.76  )-----------( 142      ( 26 Jun 2022 07:11 )             33.2     06-26    137  |  103  |  6<L>  ----------------------------<  91  4.2   |  24  |  0.83    Ca    8.6      26 Jun 2022 07:11  Phos  4.8     06-26  Mg     1.8     06-26    TPro  6.7  /  Alb  4.0  /  TBili  1.1  /  DBili  x   /  AST  14  /  ALT  10  /  AlkPhos  67  06-26    CARDIAC MARKERS ( 24 Jun 2022 16:52 )  x     / 0.01 ng/mL / 39 U/L / x     / <1.0 ng/mL    RADIOLOGY:  < from: Xray Chest 1 View- PORTABLE-Urgent (Xray Chest 1 View- PORTABLE-Urgent .) (06.24.22 @ 13:56) >  No acute infiltrates.    < end of copied text >    < from: CT Abdomen and Pelvis w/ Oral Cont and w/ IV Cont (06.22.22 @ 23:04) >  LOWER CHEST: Diffuse groundglass opacity of the visualized pulmonary   parenchyma. Cardiomegaly.    LIVER: Hypoattenuation of the hepatic parenchyma consistent with fatty   infiltration..  BILE DUCTS: Normal caliber.  GALLBLADDER: Cholelithiasis.  SPLEEN: Splenomegaly, measuring 16.6 cm in greatest dimension.  PANCREAS: Within normal limits.  ADRENALS: Within normal limits.  KIDNEYS/URETERS: Within normal limits.    BLADDER: Within normal limits.  REPRODUCTIVE ORGANS: Prostate within normal limits.    BOWEL: At the time of image acquisition, enteric contrast had progressed   to the level of the ileum. No bowel obstruction. Diffuse colonic wall   thickening and hypoattenuation. Dilatation of the base of the appendix to   1.2 cm with borderline appendiceal wallthickening to 3.5 mm (series 5   image 58). Small volume free fluid in the right lower quadrant (series 3   image 121). Mixed air/stool noted in the appendiceal lumen. No   appendicolith. No extraluminal air or rim-enhancing fluid collection to   suggest perforation.  PERITONEUM: Small volume free fluid in the right lower quadrant.  VESSELS: Within normal limits.  RETROPERITONEUM/LYMPH NODES: Mesenteric lymphadenopathy, most prominent   in the right lower quadrant.  ABDOMINAL WALL: Within normal limits.  BONES: Within normal limits.    IMPRESSION:  1.  Diffuse colonic wall thickening and hypoattenuation may be secondary   to colitis or underdistention.  2.  Dilatation of the base of the appendix with borderline appendiceal   wall thickening and small volume free fluid in the right lower quadrant   may represent acute appendicitis or reactive inflammation secondary to   colitis.  3.  Diffuse groundglass opacification of the visualized pulmonary   parenchyma, likely infectious/inflammatory.  4.  Cardiomegaly.    < end of copied text >    PHYSICAL EXAM:  GEN: No acute distress  HEENT:   LUNGS: Clear to auscultation bilaterally   HEART: S1/S2 present. RRR.   ABD: Soft, non-tender, non-distended. Bowel sounds present  EXT:  NEURO: AAOX3

## 2022-06-26 NOTE — DISCHARGE NOTE NURSING/CASE MANAGEMENT/SOCIAL WORK - PATIENT PORTAL LINK FT
You can access the FollowMyHealth Patient Portal offered by [facility] by registering at the following website: [email or web address]. By joining [facility]’s FollowMyHealth portal, you will also be able to view your health information using other applications (apps) compatible with our system.

## 2022-06-26 NOTE — CONSULT NOTE ADULT - ASSESSMENT
20 yo M PMHx sickle cell anemia and asthma presented with diarrhea x 3 days, with lower abdominal pain, found to have E. coli diarrhea with hospital course complicated by possible vasoocclusive episode involving bilateral lower extremities. Hematology consulted for further management.    #) DIAGNOSIS  - Sickle cell disease    #) PLAN  - Hb electrophoresis  - PO Folate 1 mg QD   - Pain management per primary team  - Pharmacologic DVT ppx   - Maintain active T+S, transfuse per Heme recommendations only or if hemodynamically unstable     To discuss with Dr. Story

## 2022-06-30 DIAGNOSIS — A04.4 OTHER INTESTINAL ESCHERICHIA COLI INFECTIONS: ICD-10-CM

## 2022-06-30 DIAGNOSIS — Z20.822 CONTACT WITH AND (SUSPECTED) EXPOSURE TO COVID-19: ICD-10-CM

## 2022-06-30 DIAGNOSIS — D57.00 HB-SS DISEASE WITH CRISIS, UNSPECIFIED: ICD-10-CM

## 2022-06-30 DIAGNOSIS — K52.9 NONINFECTIVE GASTROENTERITIS AND COLITIS, UNSPECIFIED: ICD-10-CM

## 2022-06-30 DIAGNOSIS — J45.909 UNSPECIFIED ASTHMA, UNCOMPLICATED: ICD-10-CM
